# Patient Record
Sex: MALE | Race: WHITE | NOT HISPANIC OR LATINO | Employment: FULL TIME | URBAN - METROPOLITAN AREA
[De-identification: names, ages, dates, MRNs, and addresses within clinical notes are randomized per-mention and may not be internally consistent; named-entity substitution may affect disease eponyms.]

---

## 2018-08-07 ENCOUNTER — OFFICE VISIT (OUTPATIENT)
Dept: URGENT CARE | Facility: CLINIC | Age: 28
End: 2018-08-07
Payer: COMMERCIAL

## 2018-08-07 VITALS
OXYGEN SATURATION: 99 % | HEART RATE: 91 BPM | TEMPERATURE: 97.8 F | SYSTOLIC BLOOD PRESSURE: 138 MMHG | RESPIRATION RATE: 16 BRPM | HEIGHT: 74 IN | BODY MASS INDEX: 28.77 KG/M2 | DIASTOLIC BLOOD PRESSURE: 90 MMHG | WEIGHT: 224.2 LBS

## 2018-08-07 DIAGNOSIS — H60.332 ACUTE SWIMMER'S EAR OF LEFT SIDE: Primary | ICD-10-CM

## 2018-08-07 PROCEDURE — 99203 OFFICE O/P NEW LOW 30 MIN: CPT | Performed by: PHYSICIAN ASSISTANT

## 2018-08-07 NOTE — PATIENT INSTRUCTIONS
Use ear drops as directed  Lie with your left ear facing upwards for 5 minutes after instilling the drops  Tylenol for discomfort  Do not use any Q-tips, hydrogen peroxide, or other intra-ear products  Follow up with your family doctor in 3-5 days  Proceed to the ER if symptoms worsen

## 2018-08-07 NOTE — PROGRESS NOTES
[facility]        NAME: Navneet Eng  is a 33 y o  male  : 1990    MRN: [id number]  DATE: 2018  TIME: 12:07 PM    Assessment and Plan   Acute swimmer's ear of left side [H60 332]  1  Acute swimmer's ear of left side  neomycin-polymyxin-hydrocortisone (CORTISPORIN) otic solution     Patient Instructions   Use ear drops as directed  Lie with your left ear facing upwards for 5 minutes after instilling the drops  Tylenol for discomfort  Do not use any Q-tips, hydrogen peroxide, or other intra-ear products  Follow up with your family doctor in 3-5 days  Proceed to the ER if symptoms worsen  Reviewed elevated BP with pt  Advised having a recheck by PCP  Chief Complaint     Chief Complaint   Patient presents with    Earache     Pt states left ear discomfort x5 days pt states  some pain and clogged  Pt has been swimming recently  Pt used OTC drops for earaches  History of Present Illness       34 y/o male presenting with c/o sensation of left ear clogging and pain x 4 days  PT states symptoms feel similar to previous swimmer's ear infections that he's had, noting he does a lot of swimming  He did attempt to treat with OTC pain relieving drops with no relief  No F/C/HA, tinnitus, change in hearing, dizziness/lightheadedness, or balance abn  Review of Systems   Review of Systems   Constitutional: Negative for chills, fatigue and fever  HENT: Negative for ear discharge and hearing loss  Respiratory: Negative for shortness of breath and wheezing  Cardiovascular: Negative for chest pain and palpitations  Gastrointestinal: Negative for abdominal pain, diarrhea, nausea and vomiting  Musculoskeletal: Negative for arthralgias and myalgias  Skin: Negative for rash  Neurological: Negative for dizziness, light-headedness and headaches       Current Medications       Current Outpatient Prescriptions:     neomycin-polymyxin-hydrocortisone (CORTISPORIN) otic solution, Administer 4 drops into the left ear every 8 (eight) hours for 7 days   Lie with the ear upward for 5 minutes  , Disp: 10 mL, Rfl: 0    Current Allergies     Allergies as of 08/07/2018    (No Known Allergies)          The following portions of the patient's history were reviewed and updated as appropriate: allergies, current medications, past family history, past medical history, past social history, past surgical history and problem list      History reviewed  No pertinent past medical history  Past Surgical History:   Procedure Laterality Date    CLAVICLE SURGERY         Family History   Problem Relation Age of Onset    No Known Problems Mother     No Known Problems Father      Medications have been verified  Objective   /90 (BP Location: Right arm, Patient Position: Sitting, Cuff Size: Large)   Pulse 91   Temp 97 8 °F (36 6 °C) (Temporal)   Resp 16   Ht 6' 2" (1 88 m)   Wt 102 kg (224 lb 3 2 oz)   SpO2 99%   BMI 28 79 kg/m²      Physical Exam     Physical Exam   Constitutional: He is oriented to person, place, and time  He appears well-developed and well-nourished  No distress  HENT:   Head: Normocephalic and atraumatic  Right Ear: Hearing, tympanic membrane, external ear and ear canal normal    Left Ear: Hearing, tympanic membrane and external ear normal    Mild edema, skin flaking, and scant white discharge visible in the left ear canal  TM visible with no abn  Pinna NT to tugging  Eyes: Conjunctivae are normal    Cardiovascular: Normal rate, regular rhythm and normal heart sounds  Pulmonary/Chest: Effort normal and breath sounds normal  No respiratory distress  He has no decreased breath sounds  He has no wheezes  He has no rhonchi  He has no rales  Neurological: He is alert and oriented to person, place, and time  No cranial nerve deficit  He exhibits normal muscle tone  Coordination normal    Skin: Skin is warm and dry  No rash noted  He is not diaphoretic     Psychiatric: He has a normal mood and affect  His behavior is normal    Nursing note and vitals reviewed

## 2018-12-04 ENCOUNTER — OFFICE VISIT (OUTPATIENT)
Dept: FAMILY MEDICINE CLINIC | Facility: CLINIC | Age: 28
End: 2018-12-04
Payer: COMMERCIAL

## 2018-12-04 VITALS
RESPIRATION RATE: 16 BRPM | HEART RATE: 92 BPM | DIASTOLIC BLOOD PRESSURE: 70 MMHG | BODY MASS INDEX: 29.26 KG/M2 | TEMPERATURE: 98.2 F | HEIGHT: 74 IN | SYSTOLIC BLOOD PRESSURE: 158 MMHG | OXYGEN SATURATION: 97 % | WEIGHT: 228 LBS
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
## 2018-12-04 DIAGNOSIS — Z00.00 ENCOUNTER FOR ANNUAL GENERAL MEDICAL EXAMINATION WITHOUT ABNORMAL FINDINGS IN ADULT: Primary | ICD-10-CM

## 2018-12-04 DIAGNOSIS — Z23 NEED FOR DIPHTHERIA-TETANUS-PERTUSSIS (TDAP) VACCINE: ICD-10-CM

## 2018-12-04 DIAGNOSIS — M25.50 ARTHRALGIA, UNSPECIFIED JOINT: ICD-10-CM

## 2018-12-04 DIAGNOSIS — L20.82 FLEXURAL ECZEMA: ICD-10-CM

## 2018-12-04 DIAGNOSIS — Z13.1 SCREENING FOR DIABETES MELLITUS: ICD-10-CM

## 2018-12-04 DIAGNOSIS — F17.200 TOBACCO USE DISORDER: ICD-10-CM

## 2018-12-04 DIAGNOSIS — Z23 NEED FOR INFLUENZA VACCINATION: ICD-10-CM

## 2018-12-04 DIAGNOSIS — R03.0 ELEVATED BLOOD-PRESSURE READING WITHOUT DIAGNOSIS OF HYPERTENSION: ICD-10-CM

## 2018-12-04 DIAGNOSIS — Z13.220 SCREENING FOR LIPID DISORDERS: ICD-10-CM

## 2018-12-04 DIAGNOSIS — Z13.29 SCREENING FOR THYROID DISORDER: ICD-10-CM

## 2018-12-04 DIAGNOSIS — Z87.09 HISTORY OF ASTHMA: ICD-10-CM

## 2018-12-04 PROBLEM — J02.9 PHARYNGITIS: Status: ACTIVE | Noted: 2017-10-09

## 2018-12-04 PROBLEM — J02.9 PHARYNGITIS: Status: RESOLVED | Noted: 2017-10-09 | Resolved: 2018-12-04

## 2018-12-04 PROCEDURE — 90682 RIV4 VACC RECOMBINANT DNA IM: CPT

## 2018-12-04 PROCEDURE — 90471 IMMUNIZATION ADMIN: CPT

## 2018-12-04 PROCEDURE — 90472 IMMUNIZATION ADMIN EACH ADD: CPT

## 2018-12-04 PROCEDURE — 3008F BODY MASS INDEX DOCD: CPT | Performed by: NURSE PRACTITIONER

## 2018-12-04 PROCEDURE — 99213 OFFICE O/P EST LOW 20 MIN: CPT | Performed by: NURSE PRACTITIONER

## 2018-12-04 PROCEDURE — 36415 COLL VENOUS BLD VENIPUNCTURE: CPT | Performed by: NURSE PRACTITIONER

## 2018-12-04 PROCEDURE — 90715 TDAP VACCINE 7 YRS/> IM: CPT

## 2018-12-04 PROCEDURE — 99395 PREV VISIT EST AGE 18-39: CPT | Performed by: NURSE PRACTITIONER

## 2018-12-04 NOTE — ASSESSMENT & PLAN NOTE
I recommend nutrition and exercise  Monitor AM and PM blood pressures at home and record  Discussed cardiac and stroke risks of having high BP's, especially when consider his young age  Recommend pt return in one week to see home BP's and will evaluate need for possible medication management or further evaluation

## 2018-12-04 NOTE — PROGRESS NOTES
Assessment/Plan:    Problem List Items Addressed This Visit     Elevated blood-pressure reading without diagnosis of hypertension     I recommend nutrition and exercise  Monitor AM and PM blood pressures at home and record  Discussed cardiac and stroke risks of having high BP's, especially when consider his young age  Recommend pt return in one week to see home BP's and will evaluate need for possible medication management or further evaluation          History of asthma     Currently no issues, no medication management at this time  Discussed appropriate allergy control to avoid symptom exaccerbation         Screening for thyroid disorder    Relevant Orders    TSH, 3rd generation with Free T4 reflex    Tobacco use disorder     Briefly discussed implications for smoking cessation  Pt reports he has quit for 5 years in the past but picked up the habit once again  - encouraged pt that if he has quit in the past, he can do so again  - encouraged setting quit date when he is ready         Flexural eczema     Discussed allergy control and hydration of skin to avoid exacerbation of symptoms            Other Visit Diagnoses     Arthralgia, unspecified joint        Relevant Orders    CBC and differential    Lyme disease, western blot    Tick bite, initial encounter        Relevant Orders    Lyme disease, western blot           Patient Instructions     Monitor BP's AM and PM x's 1-2 weeks and record  - return to the office for review of results  - we may need to start on low dose medication at that time for cardiac protection    Fluids, exercise and nutrition  - whole grains, fruits and vegetables    Find a dentist for regular oral health check ups    Return in about 1 week (around 12/11/2018) for Recheck  Subjective:      Patient ID: Yohannes Shukla  is a 33 y o  male      Chief Complaint   Patient presents with    establish a new primary physician    Tdap and influenza vaccine       Jeet Edmondson is a 33year old male who presents to the office to establish care and be evaluated for joint pain and eczema  Reports he had been bit by deer tick multiple times over the summer and the latest bite was 3 weeks ago  Pt has had joint pain in upper and lower extremities for months intermittently  Denies fevers, chills, chest pain or SOB  Also c/o eczema rash, chronic in the areas of his elbows and both wrists with itching  Denies any other rashes  No other acute complaints  GF accompanies pt and states he has a history of high blood pressures for which she is concerned  The following portions of the patient's history were reviewed and updated as appropriate: allergies, current medications, past family history, past medical history, past social history, past surgical history and problem list     Review of Systems   Constitutional: Negative for chills, diaphoresis, fatigue and fever  Respiratory: Negative for chest tightness, shortness of breath and wheezing  Cardiovascular: Negative for chest pain  Musculoskeletal: Positive for arthralgias (upper and lower extremity joints intermittently)  Skin: Positive for rash (elbow and wrist flexors)  Neurological: Negative for dizziness and headaches  No current outpatient prescriptions on file  No current facility-administered medications for this visit  Objective:    /70   Pulse 92   Temp 98 2 °F (36 8 °C) (Temporal)   Resp 16   Ht 6' 1 75" (1 873 m)   Wt 103 kg (228 lb)   SpO2 97%   BMI 29 47 kg/m²        Physical Exam   Constitutional: He is oriented to person, place, and time  He appears well-developed and well-nourished  No distress  Eyes: Conjunctivae are normal  Right eye exhibits no discharge  Left eye exhibits no discharge  Neck: Normal range of motion  Neck supple  No tracheal deviation present  No thyromegaly present     Cardiovascular: Normal rate, regular rhythm, S1 normal, S2 normal, normal heart sounds, intact distal pulses and normal pulses  Exam reveals no gallop, no S3, no S4, no distant heart sounds and no friction rub  No murmur heard  Pulmonary/Chest: Effort normal and breath sounds normal  No respiratory distress  He has no wheezes  He has no rales  He exhibits no tenderness  Abdominal: Soft  Bowel sounds are normal  He exhibits no distension  There is no tenderness  Lymphadenopathy:     He has no cervical adenopathy  Neurological: He is alert and oriented to person, place, and time  Skin: Skin is warm and dry  Rash noted  Rash is papular  He is not diaphoretic  Erythematous papular rash noted in bilateral elbow flexors and left wrist flexor   Psychiatric: He has a normal mood and affect   His behavior is normal  Judgment and thought content normal          CATRACHITA Pride

## 2018-12-04 NOTE — ASSESSMENT & PLAN NOTE
Briefly discussed implications for smoking cessation  Pt reports he has quit for 5 years in the past but picked up the habit once again  - encouraged pt that if he has quit in the past, he can do so again  - encouraged setting quit date when he is ready

## 2018-12-04 NOTE — PATIENT INSTRUCTIONS
Monitor BP's AM and PM x's 1-2 weeks and record  - return to the office for review of results  - we may need to start on low dose medication at that time for cardiac protection    Fluids, exercise and nutrition  - whole grains, fruits and vegetables    Find a dentist for regular oral health check ups    Wellness Visit for Adults   AMBULATORY CARE:   A wellness visit  is when you see your healthcare provider to get screened for health problems  You can also get advice on how to stay healthy  Write down your questions so you remember to ask them  Ask your healthcare provider how often you should have a wellness visit  What happens at a wellness visit:  Your healthcare provider will ask about your health, and your family history of health problems  This includes high blood pressure, heart disease, and cancer  He or she will ask if you have symptoms that concern you, if you smoke, and about your mood  You may also be asked about your intake of medicines, supplements, food, and alcohol  Any of the following may be done:  · Your weight  will be checked  Your height may also be checked so your body mass index (BMI) can be calculated  Your BMI shows if you are at a healthy weight  · Your blood pressure  and heart rate will be checked  Your temperature may also be checked  · Blood and urine tests  may be done  Blood tests may be done to check your cholesterol levels  Abnormal cholesterol levels increase your risk for heart disease and stroke  You may also need a blood or urine test to check for diabetes if you are at increased risk  Urine tests may be done to look for signs of an infection or kidney disease  · A physical exam  includes checking your heartbeat and lungs with a stethoscope  Your healthcare provider may also check your skin to look for sun damage  · Screening tests  may be recommended  A screening test is done to check for diseases that may not cause symptoms   The screening tests you may need depend on your age, gender, family history, and lifestyle habits  For example, colorectal screening may be recommended if you are 48years old or older  Screening tests you need if you are a woman:   · A Pap smear  is used to screen for cervical cancer  Pap smears are usually done every 3 to 5 years depending on your age  You may need them more often if you have had abnormal Pap smear test results in the past  Ask your healthcare provider how often you should have a Pap smear  · A mammogram  is an x-ray of your breasts to screen for breast cancer  Experts recommend mammograms every 2 years starting at age 48 years  You may need a mammogram at age 52 years or younger if you have an increased risk for breast cancer  Talk to your healthcare provider about when you should start having mammograms and how often you need them  Vaccines you may need:   · Get an influenza vaccine  every year  The influenza vaccine protects you from the flu  Several types of viruses cause the flu  The viruses change over time, so new vaccines are made each year  · Get a tetanus-diphtheria (Td) booster vaccine  every 10 years  This vaccine protects you against tetanus and diphtheria  Tetanus is a severe infection that may cause painful muscle spasms and lockjaw  Diphtheria is a severe bacterial infection that causes a thick covering in the back of your mouth and throat  · Get a human papillomavirus (HPV) vaccine  if you are female and aged 23 to 32 or male 23 to 24 and never received it  This vaccine protects you from HPV infection  HPV is the most common infection spread by sexual contact  HPV may also cause vaginal, penile, and anal cancers  · Get a pneumococcal vaccine  if you are aged 72 years or older  The pneumococcal vaccine is an injection given to protect you from pneumococcal disease  Pneumococcal disease is an infection caused by pneumococcal bacteria   The infection may cause pneumonia, meningitis, or an ear infection  · Get a shingles vaccine  if you are aged 61 or older, even if you have had shingles before  The shingles vaccine is an injection to protect you from the varicella-zoster virus  This is the same virus that causes chickenpox  Shingles is a painful rash that develops in people who had chickenpox or have been exposed to the virus  How to eat healthy:  My Plate is a model for planning healthy meals  It shows the types and amounts of foods that should go on your plate  Fruits and vegetables make up about half of your plate, and grains and protein make up the other half  A serving of dairy is included on the side of your plate  The amount of calories and serving sizes you need depends on your age, gender, weight, and height  Examples of healthy foods are listed below:  · Eat a variety of vegetables  such as dark green, red, and orange vegetables  You can also include canned vegetables low in sodium (salt) and frozen vegetables without added butter or sauces  · Eat a variety of fresh fruits , canned fruit in 100% juice, frozen fruit, and dried fruit  · Include whole grains  At least half of the grains you eat should be whole grains  Examples include whole-wheat bread, wheat pasta, brown rice, and whole-grain cereals such as oatmeal     · Eat a variety of protein foods such as seafood (fish and shellfish), lean meat, and poultry without skin (turkey and chicken)  Examples of lean meats include pork leg, shoulder, or tenderloin, and beef round, sirloin, tenderloin, and extra lean ground beef  Other protein foods include eggs and egg substitutes, beans, peas, soy products, nuts, and seeds  · Choose low-fat dairy products such as skim or 1% milk or low-fat yogurt, cheese, and cottage cheese  · Limit unhealthy fats  such as butter, hard margarine, and shortening  Exercise:  Exercise at least 30 minutes per day on most days of the week   Some examples of exercise include walking, biking, dancing, and swimming  You can also fit in more physical activity by taking the stairs instead of the elevator or parking farther away from stores  Include muscle strengthening activities 2 days each week  Regular exercise provides many health benefits  It helps you manage your weight, and decreases your risk for type 2 diabetes, heart disease, stroke, and high blood pressure  Exercise can also help improve your mood  Ask your healthcare provider about the best exercise plan for you  General health and safety guidelines:   · Do not smoke  Nicotine and other chemicals in cigarettes and cigars can cause lung damage  Ask your healthcare provider for information if you currently smoke and need help to quit  E-cigarettes or smokeless tobacco still contain nicotine  Talk to your healthcare provider before you use these products  · Limit alcohol  A drink of alcohol is 12 ounces of beer, 5 ounces of wine, or 1½ ounces of liquor  · Lose weight, if needed  Being overweight increases your risk of certain health conditions  These include heart disease, high blood pressure, type 2 diabetes, and certain types of cancer  · Protect your skin  Do not sunbathe or use tanning beds  Use sunscreen with a SPF 15 or higher  Apply sunscreen at least 15 minutes before you go outside  Reapply sunscreen every 2 hours  Wear protective clothing, hats, and sunglasses when you are outside  · Drive safely  Always wear your seatbelt  Make sure everyone in your car wears a seatbelt  A seatbelt can save your life if you are in an accident  Do not use your cell phone when you are driving  This could distract you and cause an accident  Pull over if you need to make a call or send a text message  · Practice safe sex  Use latex condoms if are sexually active and have more than one partner  Your healthcare provider may recommend screening tests for sexually transmitted infections (STIs)      · Wear helmets, lifejackets, and protective gear  Always wear a helmet when you ride a bike or motorcycle, go skiing, or play sports that could cause a head injury  Wear protective equipment when you play sports  Wear a lifejacket when you are on a boat or doing water sports  © 2017 [employer] Information is for End User's use only and may not be sold, redistributed or otherwise used for commercial purposes  All illustrations and images included in CareNotes® are the copyrighted property of [employer]  or [employer]  The above information is an  only  It is not intended as medical advice for individual conditions or treatments  Talk to your doctor, nurse or pharmacist before following any medical regimen to see if it is safe and effective for you

## 2018-12-04 NOTE — PROGRESS NOTES
FAMILY PRACTICE HEALTH MAINTENANCE OFFICE VISIT  [facility]  [facility]    NAME: Monika Duque  AGE: 33 y o   SEX: male  : 1990     DATE: 2018    Assessment and Plan     Problem List Items Addressed This Visit     Elevated blood-pressure reading without diagnosis of hypertension     I recommend nutrition and exercise  Monitor AM and PM blood pressures at home and record  Discussed cardiac and stroke risks of having high BP's, especially when consider his young age  Recommend pt return in one week to see home BP's and will evaluate need for possible medication management or further evaluation          History of asthma     Currently no issues, no medication management at this time  Discussed appropriate allergy control to avoid symptom exaccerbation         Screening for thyroid disorder    Relevant Orders    TSH, 3rd generation with Free T4 reflex    Tobacco use disorder     Briefly discussed implications for smoking cessation  Pt reports he has quit for 5 years in the past but picked up the habit once again  - encouraged pt that if he has quit in the past, he can do so again  - encouraged setting quit date when he is ready         Flexural eczema     Discussed allergy control and hydration of skin to avoid exacerbation of symptoms            Other Visit Diagnoses     Encounter for annual general medical examination without abnormal findings in adult    -  Primary    Relevant Orders    CBC and differential    Comprehensive metabolic panel    TSH, 3rd generation with Free T4 reflex    Lipid panel    Arthralgia, unspecified joint        Relevant Orders    CBC and differential    Lyme disease, western blot    Screening for diabetes mellitus        Relevant Orders    Comprehensive metabolic panel    Screening for lipid disorders        Relevant Orders    Lipid panel    Tick bite, initial encounter        Relevant Orders    Lyme disease, western blot    Need for influenza vaccination        Relevant Orders    PREFERRED: influenza vaccine, [id number], quadrivalent, recombinant, PF, 0 5 mL (FLUBLOK) (Completed)    Need for diphtheria-tetanus-pertussis (Tdap) vaccine        Relevant Orders    TDAP VACCINE GREATER THAN OR EQUAL TO 6YO IM (Completed)            · Patient Counseling:   · Nutrition: Stressed importance of a well balanced diet, moderation of sodium/saturated fat, caloric balance and sufficient intake of fiber  · Exercise: Stressed the importance of regular exercise with a goal of 150 minutes per week  · Dental Health: Discussed daily flossing and brushing and regular dental visits   · Alcohol Use:  Recommended moderation of alcohol intake  · Injury Prevention: Discussed Safety Belts, Safety Helmets, and Smoke Detectors    · Immunizations reviewed DISCUSSED IMPORTANCE OF INFLUENZA AND TDAP  DISCUSSED INDICATIONS FOR VACCINATION  · Discussed benefits of screening AGE APPROPRIATE SCREENING  BMI Counseling: Body mass index is 29 47 kg/m²  Discussed with patient's BMI with him  The BMI is above average  BMI counseling and education was provided to the patient  Nutrition recommendations include decreasing overall calorie intake, 3-5 servings of fruits/vegetables daily, reducing fast food intake, consuming healthier snacks, decreasing soda and/or juice intake, moderation in carbohydrate intake and increasing intake of lean protein  Exercise recommendations include moderate aerobic physical activity for 150 minutes/week  Return in about 1 week (around 12/11/2018) for Recheck  Chief Complaint     Chief Complaint   Patient presents with    establish a new primary physician    Tdap and influenza vaccine       History of Present Illness     HPI    Well Adult Physical   Patient here for a comprehensive physical exam       Diet and Physical Activity  Diet: well balanced diet  Weight concerns: Patient is overweight (BMI 25 0-29  9)  Exercise: infrequently Depression Screen  PHQ-9 Depression Screening    PHQ-9:    Frequency of the following problems over the past two weeks:       Little interest or pleasure in doing things:  0 - not at all  Feeling down, depressed, or hopeless:  0 - not at all  PHQ-2 Score:  0          General Health  Hearing: Normal:  bilateral  Vision: no vision problems  Dental: DISCUSSED INDICATIONS FOR DENTAL FOLLOW UP AND EVALUATION OF ORAL HEALTH  RECOMMEND FINDING DENTIST AND SCHEDULING APPT    Reproductive Health  SELF BREAST AND TESTICULAR EXAMS    The following portions of the patient's history were reviewed and updated as appropriate: allergies, current medications, past family history, past medical history, past social history, past surgical history and problem list     Review of Systems     Review of Systems   Constitutional: Positive for fatigue  Negative for diaphoresis and fever  HENT: Negative for ear pain and hearing loss  Eyes: Negative for pain and visual disturbance  Respiratory: Negative for chest tightness and shortness of breath  Cardiovascular: Negative for chest pain, palpitations and leg swelling  Gastrointestinal: Negative for abdominal pain, constipation and diarrhea  Genitourinary: Negative for difficulty urinating  Musculoskeletal: Positive for arthralgias  Negative for myalgias  Skin: Positive for rash  Neurological: Negative for dizziness, numbness and headaches  Psychiatric/Behavioral: Negative for sleep disturbance         Past Medical History     Past Medical History:   Diagnosis Date    Flexural eczema 12/4/2018       Past Surgical History     Past Surgical History:   Procedure Laterality Date    CLAVICLE SURGERY         Social History     Social History     Social History    Marital status: Unknown     Spouse name: N/A    Number of children: N/A    Years of education: N/A     Social History Main Topics    Smoking status: Current Every Day Smoker     Packs/day: 1 00     Years: 3 00    Smokeless tobacco: Never Used    Alcohol use Yes      Comment: social    Drug use: No    Sexual activity: Not Asked     Other Topics Concern    None     Social History Narrative    None       Family History     Family History   Problem Relation Age of Onset    No Known Problems Mother     No Known Problems Father     Mental illness Paternal Grandmother     Substance Abuse Neg Hx        Current Medications     No current outpatient prescriptions on file  Allergies     No Known Allergies    Objective     /70   Pulse 92   Temp 98 2 °F (36 8 °C) (Temporal)   Resp 16   Ht 6' 1 75" (1 873 m)   Wt 103 kg (228 lb)   SpO2 97%   BMI 29 47 kg/m²      Physical Exam   Constitutional: He is oriented to person, place, and time  He appears well-developed and well-nourished  HENT:   Head: Normocephalic and atraumatic  Right Ear: Tympanic membrane and external ear normal    Left Ear: Tympanic membrane and external ear normal    Eyes: Pupils are equal, round, and reactive to light  Conjunctivae, EOM and lids are normal  Right eye exhibits no discharge  Left eye exhibits no discharge  Right conjunctiva is not injected  Left conjunctiva is not injected  Right eye exhibits normal extraocular motion and no nystagmus  Left eye exhibits normal extraocular motion and no nystagmus  Right pupil is round and reactive  Left pupil is round and reactive  Pupils are equal    Neck: Normal range of motion  Neck supple  Carotid bruit is not present  No tracheal deviation present  No thyromegaly present  Cardiovascular: Normal rate, regular rhythm, normal heart sounds and intact distal pulses  Exam reveals no gallop and no friction rub  No murmur heard  Pulmonary/Chest: Effort normal and breath sounds normal  No respiratory distress  He has no wheezes  He has no rales  Abdominal: Soft  Bowel sounds are normal  He exhibits no distension  There is no splenomegaly or hepatomegaly  There is no tenderness   There is no rebound  Genitourinary: Rectum normal, prostate normal, testes normal and penis normal    Musculoskeletal: Normal range of motion  He exhibits no edema, tenderness or deformity  Lymphadenopathy:     He has no cervical adenopathy  Neurological: He is alert and oriented to person, place, and time  He has normal strength and normal reflexes  No cranial nerve deficit  Coordination normal    Skin: Skin is warm and dry  Rash noted  Rash is papular  No erythema  Erythematous papular rash noted elbow flexors and left wrist   Psychiatric: He has a normal mood and affect   His behavior is normal  Judgment and thought content normal          Health Maintenance     Health Maintenance   Topic Date Due    Pneumococcal PPSV23 Medium Risk Adult (1 of 1 - PPSV23) 02/25/2010    Depression Screening PHQ  12/04/2019    DTaP,Tdap,and Td Vaccines (2 - Td) 12/04/2028    INFLUENZA VACCINE  Completed     Immunization History   Administered Date(s) Administered    Influenza, recombinant, quadrivalent,injectable, preservative free 12/04/2018    Tdap 12/04/2018       Maria Dolores Smith, [address] [facility],[address]

## 2018-12-04 NOTE — ASSESSMENT & PLAN NOTE
Currently no issues, no medication management at this time  Discussed appropriate allergy control to avoid symptom exaccerbation

## 2018-12-05 LAB
ALBUMIN SERPL-MCNC: 5 G/DL (ref 3.5–5.5)
ALBUMIN/GLOB SERPL: 1.6 {RATIO} (ref 1.2–2.2)
ALP SERPL-CCNC: 68 IU/L (ref 39–117)
ALT SERPL-CCNC: 61 IU/L (ref 0–44)
AST SERPL-CCNC: 53 IU/L (ref 0–40)
B BURGDOR IGG PATRN SER IB-IMP: NEGATIVE
B BURGDOR IGM PATRN SER IB-IMP: NEGATIVE
B BURGDOR18KD IGG SER QL IB: ABNORMAL
B BURGDOR23KD IGG SER QL IB: ABNORMAL
B BURGDOR23KD IGM SER QL IB: ABNORMAL
B BURGDOR28KD IGG SER QL IB: ABNORMAL
B BURGDOR30KD IGG SER QL IB: ABNORMAL
B BURGDOR39KD IGG SER QL IB: ABNORMAL
B BURGDOR39KD IGM SER QL IB: ABNORMAL
B BURGDOR41KD IGG SER QL IB: ABNORMAL
B BURGDOR41KD IGM SER QL IB: PRESENT
B BURGDOR45KD IGG SER QL IB: ABNORMAL
B BURGDOR58KD IGG SER QL IB: ABNORMAL
B BURGDOR66KD IGG SER QL IB: ABNORMAL
B BURGDOR93KD IGG SER QL IB: ABNORMAL
BASOPHILS # BLD AUTO: 0 X10E3/UL (ref 0–0.2)
BASOPHILS NFR BLD AUTO: 1 %
BILIRUB SERPL-MCNC: 0.5 MG/DL (ref 0–1.2)
BUN SERPL-MCNC: 12 MG/DL (ref 6–20)
BUN/CREAT SERPL: 12 (ref 9–20)
CALCIUM SERPL-MCNC: 10 MG/DL (ref 8.7–10.2)
CHLORIDE SERPL-SCNC: 100 MMOL/L (ref 96–106)
CHOLEST SERPL-MCNC: 265 MG/DL (ref 100–199)
CHOLEST/HDLC SERPL: 4.4 RATIO (ref 0–5)
CO2 SERPL-SCNC: 23 MMOL/L (ref 20–29)
CREAT SERPL-MCNC: 1 MG/DL (ref 0.76–1.27)
EOSINOPHIL # BLD AUTO: 0.4 X10E3/UL (ref 0–0.4)
EOSINOPHIL NFR BLD AUTO: 5 %
ERYTHROCYTE [DISTWIDTH] IN BLOOD BY AUTOMATED COUNT: 13.2 % (ref 12.3–15.4)
GLOBULIN SER-MCNC: 3.1 G/DL (ref 1.5–4.5)
GLUCOSE SERPL-MCNC: 95 MG/DL (ref 65–99)
HCT VFR BLD AUTO: 47.4 % (ref 37.5–51)
HDLC SERPL-MCNC: 60 MG/DL
HGB BLD-MCNC: 16.2 G/DL (ref 13–17.7)
IMM GRANULOCYTES # BLD: 0 X10E3/UL (ref 0–0.1)
IMM GRANULOCYTES NFR BLD: 0 %
LDLC SERPL CALC-MCNC: 173 MG/DL (ref 0–99)
LYMPHOCYTES # BLD AUTO: 2.1 X10E3/UL (ref 0.7–3.1)
LYMPHOCYTES NFR BLD AUTO: 28 %
MCH RBC QN AUTO: 31.8 PG (ref 26.6–33)
MCHC RBC AUTO-ENTMCNC: 34.2 G/DL (ref 31.5–35.7)
MCV RBC AUTO: 93 FL (ref 79–97)
MONOCYTES # BLD AUTO: 0.6 X10E3/UL (ref 0.1–0.9)
MONOCYTES NFR BLD AUTO: 8 %
NEUTROPHILS # BLD AUTO: 4.5 X10E3/UL (ref 1.4–7)
NEUTROPHILS NFR BLD AUTO: 58 %
PLATELET # BLD AUTO: 305 X10E3/UL (ref 150–379)
POTASSIUM SERPL-SCNC: 4.1 MMOL/L (ref 3.5–5.2)
PROT SERPL-MCNC: 8.1 G/DL (ref 6–8.5)
RBC # BLD AUTO: 5.1 X10E6/UL (ref 4.14–5.8)
SL AMB EGFR AFRICAN AMERICAN: 119 ML/MIN/1.73
SL AMB EGFR NON AFRICAN AMERICAN: 103 ML/MIN/1.73
SL AMB VLDL CHOLESTEROL CALC: 32 MG/DL (ref 5–40)
SODIUM SERPL-SCNC: 138 MMOL/L (ref 134–144)
TRIGL SERPL-MCNC: 160 MG/DL (ref 0–149)
TSH SERPL DL<=0.005 MIU/L-ACNC: 1.37 UIU/ML (ref 0.45–4.5)
WBC # BLD AUTO: 7.7 X10E3/UL (ref 3.4–10.8)

## 2018-12-13 ENCOUNTER — OFFICE VISIT (OUTPATIENT)
Dept: FAMILY MEDICINE CLINIC | Facility: CLINIC | Age: 28
End: 2018-12-13
Payer: COMMERCIAL

## 2018-12-13 VITALS
SYSTOLIC BLOOD PRESSURE: 158 MMHG | WEIGHT: 240 LBS | BODY MASS INDEX: 30.8 KG/M2 | OXYGEN SATURATION: 96 % | DIASTOLIC BLOOD PRESSURE: 80 MMHG | RESPIRATION RATE: 16 BRPM | TEMPERATURE: 98.2 F | HEIGHT: 74 IN | HEART RATE: 84 BPM

## 2018-12-13 DIAGNOSIS — J45.20 MILD INTERMITTENT ASTHMA WITHOUT COMPLICATION: ICD-10-CM

## 2018-12-13 DIAGNOSIS — F17.200 TOBACCO USE DISORDER: ICD-10-CM

## 2018-12-13 DIAGNOSIS — E78.2 MIXED HYPERLIPIDEMIA: ICD-10-CM

## 2018-12-13 DIAGNOSIS — I10 ESSENTIAL HYPERTENSION: Primary | ICD-10-CM

## 2018-12-13 PROCEDURE — 99213 OFFICE O/P EST LOW 20 MIN: CPT | Performed by: NURSE PRACTITIONER

## 2018-12-13 PROCEDURE — 4004F PT TOBACCO SCREEN RCVD TLK: CPT | Performed by: NURSE PRACTITIONER

## 2018-12-13 PROCEDURE — 3008F BODY MASS INDEX DOCD: CPT | Performed by: NURSE PRACTITIONER

## 2018-12-13 RX ORDER — ALBUTEROL SULFATE 90 UG/1
2 AEROSOL, METERED RESPIRATORY (INHALATION) EVERY 6 HOURS PRN
Qty: 8.5 G | Refills: 2 | Status: SHIPPED | OUTPATIENT
Start: 2018-12-13 | End: 2019-09-10 | Stop reason: SDUPTHER

## 2018-12-13 RX ORDER — CANDESARTAN 16 MG/1
16 TABLET ORAL DAILY
Qty: 30 TABLET | Refills: 2 | Status: SHIPPED | OUTPATIENT
Start: 2018-12-13 | End: 2019-10-31 | Stop reason: SDUPTHER

## 2018-12-13 NOTE — ASSESSMENT & PLAN NOTE
Will start on low dose of candesartan to control BP  3 month goal to reduce blood pressure and cholesterol  - recommend mediterranean style eating, increased daily exercise  Discussed cardiac risks, pt verbalized understanding

## 2018-12-13 NOTE — PROGRESS NOTES
Assessment/Plan:    Problem List Items Addressed This Visit     Tobacco use disorder     Recommend smoking cessation         Mixed hyperlipidemia     Discussed 3 month goals  Recommend mediterranean style eating  - discussion and handout provided         Essential hypertension - Primary     Will start on low dose of candesartan to control BP  3 month goal to reduce blood pressure and cholesterol  - recommend mediterranean style eating, increased daily exercise  Discussed cardiac risks, pt verbalized understanding         Relevant Medications    candesartan (ATACAND) 16 mg tablet      Other Visit Diagnoses     Mild intermittent asthma without complication        Relevant Medications    albuterol (PROAIR HFA) 90 mcg/act inhaler        Return in about 3 months (around 3/13/2019) for Recheck BP and CMP, lipid panel  Subjective:      Patient ID: Per Mcfarland  is a 33 y o  male  Chief Complaint   Patient presents with    Follow-up     blood pressure and blood work       Karolina Ventura returns to the office for recheck of blood pressure and to discuss lab work  No acute complaints today  The following portions of the patient's history were reviewed and updated as appropriate: allergies, current medications, past family history, past medical history, past social history, past surgical history and problem list     Review of Systems   Constitutional: Negative for chills and fever  Respiratory: Negative for chest tightness and shortness of breath  Cardiovascular: Negative for chest pain  Current Outpatient Prescriptions   Medication Sig Dispense Refill    albuterol (PROAIR HFA) 90 mcg/act inhaler Inhale 2 puffs every 6 (six) hours as needed for wheezing 8 5 g 2    candesartan (ATACAND) 16 mg tablet Take 1 tablet (16 mg total) by mouth daily 30 tablet 2     No current facility-administered medications for this visit          Objective:    /80   Pulse 84   Temp 98 2 °F (36 8 °C) (Temporal)   Resp 16    6' 1 75" (1 873 m)   Wt 109 kg (240 lb)   SpO2 96%   BMI 31 02 kg/m²        Physical Exam   Constitutional: He is oriented to person, place, and time  He appears well-developed and well-nourished  No distress  HENT:   Head: Normocephalic and atraumatic  Eyes: Conjunctivae are normal  Right eye exhibits no discharge  Left eye exhibits no discharge  Neck: Normal range of motion  Neck supple  Neurological: He is alert and oriented to person, place, and time  Skin: He is not diaphoretic  Psychiatric: He has a normal mood and affect   His behavior is normal  Judgment and thought content normal        CATRACHITA Gupta

## 2019-09-10 DIAGNOSIS — J45.20 MILD INTERMITTENT ASTHMA WITHOUT COMPLICATION: ICD-10-CM

## 2019-09-10 RX ORDER — ALBUTEROL SULFATE 90 UG/1
2 AEROSOL, METERED RESPIRATORY (INHALATION) EVERY 6 HOURS PRN
Qty: 8.5 G | Refills: 2 | Status: SHIPPED | OUTPATIENT
Start: 2019-09-10 | End: 2020-01-03

## 2019-09-11 ENCOUNTER — OFFICE VISIT (OUTPATIENT)
Dept: URGENT CARE | Facility: CLINIC | Age: 29
End: 2019-09-11
Payer: COMMERCIAL

## 2019-09-11 VITALS
RESPIRATION RATE: 16 BRPM | BODY MASS INDEX: 30.64 KG/M2 | TEMPERATURE: 98.4 F | HEART RATE: 83 BPM | WEIGHT: 237 LBS | DIASTOLIC BLOOD PRESSURE: 108 MMHG | OXYGEN SATURATION: 100 % | SYSTOLIC BLOOD PRESSURE: 132 MMHG

## 2019-09-11 DIAGNOSIS — J45.31 MILD PERSISTENT ASTHMA WITH ACUTE EXACERBATION: Primary | ICD-10-CM

## 2019-09-11 PROCEDURE — 99213 OFFICE O/P EST LOW 20 MIN: CPT | Performed by: FAMILY MEDICINE

## 2019-09-11 RX ORDER — FLUTICASONE PROPIONATE 110 UG/1
2 AEROSOL, METERED RESPIRATORY (INHALATION) 2 TIMES DAILY
Qty: 12 G | Refills: 0 | Status: SHIPPED | OUTPATIENT
Start: 2019-09-11 | End: 2020-03-25 | Stop reason: SDUPTHER

## 2019-09-11 RX ORDER — PREDNISONE 50 MG/1
50 TABLET ORAL DAILY
Qty: 5 TABLET | Refills: 0 | Status: SHIPPED | OUTPATIENT
Start: 2019-09-11 | End: 2019-09-16

## 2019-09-11 NOTE — PROGRESS NOTES
[facility]        NAME: Brodie Lundy  is a 30 y o  male  : 1990    MRN: [id number]  DATE: 2019  TIME: 4:57 PM    Assessment and Plan   Mild persistent asthma with acute exacerbation [J45 31]  1  Mild persistent asthma with acute exacerbation  predniSONE 50 mg tablet    fluticasone (FLOVENT HFA) 110 MCG/ACT inhaler     Asthma exacerbation per clinical evaluation  Peak flow of 350 cc/minute  Refused DuoNeb treatment in the office due to outside time constraints  Prescribed a 5 day burst of prednisone and started on maintenance therapy - Flovent  Instructed to follow up with PCP for continued care and management  Patient Instructions     Follow up with PCP in 3-5 days  Proceed to  ER if symptoms worsen  Chief Complaint     Chief Complaint   Patient presents with    Shortness of Breath     pt states when lying down he feels SOB; when upright no discomfort, states the congestion loosens up; started 3 months ago when he ceased cigarrette smoking and started Juul 6-7 months ago, stopped 5-6 weeks ago; now trouble with taking breaths         History of Present Illness       78-year-old male with history of asthma presents today with increased shortness of breath and chest tightness which has persist for the duration of summer  Reports that symptoms worsen at nighttime when trying to sleep  Has experienced improvement when using his albuterol inhaler, but notices that he is starting to developed tolerance to the medication  Reports having seasonal allergies during the spring and fall  Notices that when he takes an antihistamine, his asthma flare ups are less in frequency  Denies any obvious fevers, chills or chest pain  No obvious sick contacts  Review of Systems   Review of Systems   Constitutional: Negative for chills and fever  Respiratory: Positive for cough, chest tightness, shortness of breath and wheezing  Cardiovascular: Negative for chest pain  Gastrointestinal: Negative for nausea  Allergic/Immunologic: Positive for environmental allergies  Neurological: Negative for dizziness and headaches  Current Medications       Current Outpatient Medications:     albuterol (PROAIR HFA) 90 mcg/act inhaler, Inhale 2 puffs every 6 (six) hours as needed for wheezing, Disp: 8 5 g, Rfl: 2    candesartan (ATACAND) 16 mg tablet, Take 1 tablet (16 mg total) by mouth daily (Patient not taking: Reported on 9/11/2019), Disp: 30 tablet, Rfl: 2    fluticasone (FLOVENT HFA) 110 MCG/ACT inhaler, Inhale 2 puffs 2 (two) times a day Rinse mouth after use , Disp: 12 g, Rfl: 0    predniSONE 50 mg tablet, Take 1 tablet (50 mg total) by mouth daily for 5 days, Disp: 5 tablet, Rfl: 0    Current Allergies     Allergies as of 09/11/2019    (No Known Allergies)            The following portions of the patient's history were reviewed and updated as appropriate: allergies, current medications, past family history, past medical history, past social history, past surgical history and problem list      Past Medical History:   Diagnosis Date    Asthma     Essential hypertension 12/13/2018    Flexural eczema 12/4/2018       Past Surgical History:   Procedure Laterality Date    CLAVICLE SURGERY         Family History   Problem Relation Age of Onset    No Known Problems Mother     No Known Problems Father     Mental illness Paternal Grandmother     Substance Abuse Neg Hx          Medications have been verified  Objective   BP (!) 132/108   Pulse 83   Temp 98 4 °F (36 9 °C)   Resp 16   Wt 108 kg (237 lb)   SpO2 100%   BMI 30 64 kg/m²        Physical Exam     Physical Exam   Constitutional: He is oriented to person, place, and time  He appears well-developed and well-nourished  Non-toxic appearance  He does not appear ill  No distress  HENT:   Head: Normocephalic and atraumatic  Cardiovascular: Normal rate, regular rhythm and normal heart sounds  Pulmonary/Chest: He is in respiratory distress  He has wheezes (end-expiratory) in the right upper field, the right middle field, the left upper field, the left middle field and the left lower field  He has no rhonchi  Neurological: He is alert and oriented to person, place, and time  He is not disoriented  No cranial nerve deficit  Skin: Skin is warm  No erythema  Psychiatric: He has a normal mood and affect  His behavior is normal  His mood appears not anxious  He is not agitated  Nursing note and vitals reviewed

## 2019-10-31 ENCOUNTER — OFFICE VISIT (OUTPATIENT)
Dept: FAMILY MEDICINE CLINIC | Facility: CLINIC | Age: 29
End: 2019-10-31
Payer: COMMERCIAL

## 2019-10-31 VITALS
BODY MASS INDEX: 31.54 KG/M2 | TEMPERATURE: 98 F | DIASTOLIC BLOOD PRESSURE: 78 MMHG | HEART RATE: 118 BPM | RESPIRATION RATE: 18 BRPM | WEIGHT: 238 LBS | OXYGEN SATURATION: 98 % | HEIGHT: 73 IN | SYSTOLIC BLOOD PRESSURE: 130 MMHG

## 2019-10-31 DIAGNOSIS — J45.41 MODERATE PERSISTENT ASTHMA WITH ACUTE EXACERBATION: Primary | ICD-10-CM

## 2019-10-31 DIAGNOSIS — I10 ESSENTIAL HYPERTENSION: ICD-10-CM

## 2019-10-31 PROCEDURE — 3078F DIAST BP <80 MM HG: CPT | Performed by: FAMILY MEDICINE

## 2019-10-31 PROCEDURE — 3075F SYST BP GE 130 - 139MM HG: CPT | Performed by: FAMILY MEDICINE

## 2019-10-31 PROCEDURE — 94640 AIRWAY INHALATION TREATMENT: CPT | Performed by: FAMILY MEDICINE

## 2019-10-31 PROCEDURE — 99213 OFFICE O/P EST LOW 20 MIN: CPT | Performed by: FAMILY MEDICINE

## 2019-10-31 PROCEDURE — 3008F BODY MASS INDEX DOCD: CPT | Performed by: FAMILY MEDICINE

## 2019-10-31 RX ORDER — CANDESARTAN 16 MG/1
16 TABLET ORAL DAILY
Qty: 30 TABLET | Refills: 2 | Status: SHIPPED | OUTPATIENT
Start: 2019-10-31 | End: 2020-03-06

## 2019-10-31 RX ORDER — PREDNISONE 20 MG/1
TABLET ORAL
Qty: 14 TABLET | Refills: 0 | Status: SHIPPED | OUTPATIENT
Start: 2019-10-31 | End: 2020-10-13 | Stop reason: ALTCHOICE

## 2019-11-01 RX ORDER — ALBUTEROL SULFATE 2.5 MG/3ML
2.5 SOLUTION RESPIRATORY (INHALATION) ONCE
Status: COMPLETED | OUTPATIENT
Start: 2019-11-01 | End: 2019-11-01

## 2019-11-01 RX ADMIN — ALBUTEROL SULFATE 2.5 MG: 2.5 SOLUTION RESPIRATORY (INHALATION) at 15:14

## 2019-11-01 NOTE — PROGRESS NOTES
Assessment/Plan:    1  Moderate persistent asthma with acute exacerbation  -     predniSONE 20 mg tablet; 2 PO QD X 4 DAYS, THEN 1 PO QD X 4 DAYS, THEN 1/2 PO QD X 4 DAYS  -     fluticasone-salmeterol (ADVAIR, WIXELA) 250-50 mcg/dose inhaler; Inhale 1 puff 2 (two) times a day Rinse mouth after use  -     albuterol inhalation solution 2 5 mg    2  Essential hypertension  -     candesartan (ATACAND) 16 mg tablet; Take 1 tablet (16 mg total) by mouth daily          BMI Counseling: Body mass index is 31 4 kg/m²  Discussed the patient's BMI with him  The BMI is above normal  Nutrition recommendations include decreasing overall calorie intake  Patient Instructions   PLENTY FLUIDS  REST  MUCINEX  MEDICATION AS DIRECTED  IF SYMPTOMS PERSIST OR WORSEN, PLEASE CALL    CALL IN 2 WEEKS WITH UPDATE        Return in about 6 weeks (around 12/12/2019) for Recheck  Subjective:      Patient ID: Diogo Stovall  is a 30 y o  male  Chief Complaint   Patient presents with    Wheezing       PATIENT STATES HE HAS BEEN WHEEZING MORE LATELY  SL COUGH  WENT TO [facility] - GIVEN SOME PREDNISONE - FELT BETTER, NOW WORSE    DENIES ANY ILLNESS, FEVER  NO SPUTUM  NO NVD    LONG HISTORY OF ASTHMA      The following portions of the patient's history were reviewed and updated as appropriate: allergies, current medications, past family history, past medical history, past social history, past surgical history and problem list     Review of Systems   Constitutional: Negative for chills and fever  HENT: Positive for congestion and postnasal drip  Negative for ear discharge, rhinorrhea, sinus pressure, sinus pain and sore throat  Eyes: Negative for discharge and redness  Respiratory: Positive for cough and wheezing  Cardiovascular: Negative for chest pain  Gastrointestinal: Negative for abdominal pain, constipation, diarrhea, nausea and vomiting  Musculoskeletal: Negative for arthralgias and myalgias  Skin: Negative for rash  Hematological: Negative for adenopathy  Psychiatric/Behavioral: The patient is not nervous/anxious  Current Outpatient Medications   Medication Sig Dispense Refill    albuterol (PROAIR HFA) 90 mcg/act inhaler Inhale 2 puffs every 6 (six) hours as needed for wheezing 8 5 g 2    candesartan (ATACAND) 16 mg tablet Take 1 tablet (16 mg total) by mouth daily 30 tablet 2    FEXOFENADINE HCL PO Take by mouth as needed (Allegra)      fluticasone (FLOVENT HFA) 110 MCG/ACT inhaler Inhale 2 puffs 2 (two) times a day Rinse mouth after use  (Patient not taking: Reported on 10/31/2019) 12 g 0    fluticasone-salmeterol (ADVAIR, WIXELA) 250-50 mcg/dose inhaler Inhale 1 puff 2 (two) times a day Rinse mouth after use  1 Inhaler 3    predniSONE 20 mg tablet 2 PO QD X 4 DAYS, THEN 1 PO QD X 4 DAYS, THEN 1/2 PO QD X 4 DAYS 14 tablet 0     Current Facility-Administered Medications   Medication Dose Route Frequency Provider Last Rate Last Dose    albuterol inhalation solution 2 5 mg  2 5 mg Nebulization Once Theo Duarte MD           Objective:    /78 (BP Location: Left arm, Patient Position: Sitting, Cuff Size: Large)   Pulse (!) 118   Temp 98 °F (36 7 °C) (Temporal)   Resp 18   Ht 6' 1" (1 854 m)   Wt 108 kg (238 lb)   SpO2 98%   BMI 31 40 kg/m²        Physical Exam   Constitutional: He is oriented to person, place, and time  He appears well-developed and well-nourished  HENT:   Head: Normocephalic and atraumatic  Eyes: Pupils are equal, round, and reactive to light  Conjunctivae and EOM are normal  Right eye exhibits no discharge  Left eye exhibits no discharge  Neck: Neck supple  No JVD present  No thyromegaly present  Cardiovascular: Normal rate, regular rhythm and normal heart sounds  No murmur heard  Pulmonary/Chest: Effort normal  He has wheezes  He has no rales  Abdominal: Soft  Bowel sounds are normal  He exhibits no mass  There is no hepatosplenomegaly  There is no tenderness  There is no rebound, no guarding and no CVA tenderness  Musculoskeletal: Normal range of motion  He exhibits no edema, tenderness or deformity  Lymphadenopathy:     He has no cervical adenopathy  He has no axillary adenopathy  Neurological: He is alert and oriented to person, place, and time  Skin: Skin is warm and dry  No rash noted  No erythema  Psychiatric: He has a normal mood and affect   His behavior is normal  Judgment and thought content normal           AIR MOVEMENT MUCH IMPROVED AFTER NEB TREATMENT    Sofia Marcum MD

## 2019-11-01 NOTE — PATIENT INSTRUCTIONS
PLENTY FLUIDS  REST  MUCINEX  MEDICATION AS DIRECTED  IF SYMPTOMS PERSIST OR WORSEN, PLEASE CALL    CALL IN 2 WEEKS WITH UPDATE

## 2020-01-03 DIAGNOSIS — J45.20 MILD INTERMITTENT ASTHMA WITHOUT COMPLICATION: ICD-10-CM

## 2020-03-06 DIAGNOSIS — I10 ESSENTIAL HYPERTENSION: ICD-10-CM

## 2020-03-06 RX ORDER — CANDESARTAN 16 MG/1
TABLET ORAL
Qty: 30 TABLET | Refills: 0 | Status: SHIPPED | OUTPATIENT
Start: 2020-03-06 | End: 2020-04-23

## 2020-03-25 DIAGNOSIS — J45.41 MODERATE PERSISTENT ASTHMA WITH ACUTE EXACERBATION: ICD-10-CM

## 2020-03-25 DIAGNOSIS — J45.31 MILD PERSISTENT ASTHMA WITH ACUTE EXACERBATION: ICD-10-CM

## 2020-03-25 DIAGNOSIS — J45.20 MILD INTERMITTENT ASTHMA WITHOUT COMPLICATION: ICD-10-CM

## 2020-03-26 RX ORDER — FLUTICASONE PROPIONATE 110 UG/1
2 AEROSOL, METERED RESPIRATORY (INHALATION) 2 TIMES DAILY
Qty: 12 G | Refills: 3 | Status: SHIPPED | OUTPATIENT
Start: 2020-03-26 | End: 2020-10-13

## 2020-04-23 DIAGNOSIS — I10 ESSENTIAL HYPERTENSION: ICD-10-CM

## 2020-04-23 RX ORDER — CANDESARTAN 16 MG/1
TABLET ORAL
Qty: 30 TABLET | Refills: 0 | Status: SHIPPED | OUTPATIENT
Start: 2020-04-23 | End: 2020-06-07

## 2020-06-07 DIAGNOSIS — I10 ESSENTIAL HYPERTENSION: ICD-10-CM

## 2020-06-07 RX ORDER — CANDESARTAN 16 MG/1
TABLET ORAL
Qty: 30 TABLET | Refills: 0 | Status: SHIPPED | OUTPATIENT
Start: 2020-06-07 | End: 2020-10-07

## 2020-10-07 DIAGNOSIS — I10 ESSENTIAL HYPERTENSION: ICD-10-CM

## 2020-10-07 RX ORDER — CANDESARTAN 16 MG/1
TABLET ORAL
Qty: 30 TABLET | Refills: 0 | Status: SHIPPED | OUTPATIENT
Start: 2020-10-07 | End: 2020-10-13 | Stop reason: SDUPTHER

## 2020-10-13 ENCOUNTER — TELEMEDICINE (OUTPATIENT)
Dept: FAMILY MEDICINE CLINIC | Facility: CLINIC | Age: 30
End: 2020-10-13
Payer: COMMERCIAL

## 2020-10-13 VITALS
BODY MASS INDEX: 30.48 KG/M2 | DIASTOLIC BLOOD PRESSURE: 98 MMHG | SYSTOLIC BLOOD PRESSURE: 148 MMHG | WEIGHT: 230 LBS | HEIGHT: 73 IN

## 2020-10-13 DIAGNOSIS — J45.41 MODERATE PERSISTENT ASTHMA WITH ACUTE EXACERBATION: ICD-10-CM

## 2020-10-13 DIAGNOSIS — I10 ESSENTIAL HYPERTENSION: Primary | ICD-10-CM

## 2020-10-13 DIAGNOSIS — J45.20 MILD INTERMITTENT ASTHMA WITHOUT COMPLICATION: ICD-10-CM

## 2020-10-13 PROBLEM — R03.0 ELEVATED BLOOD-PRESSURE READING WITHOUT DIAGNOSIS OF HYPERTENSION: Status: RESOLVED | Noted: 2017-10-09 | Resolved: 2020-10-13

## 2020-10-13 PROCEDURE — 99213 OFFICE O/P EST LOW 20 MIN: CPT | Performed by: NURSE PRACTITIONER

## 2020-10-13 PROCEDURE — 3725F SCREEN DEPRESSION PERFORMED: CPT | Performed by: NURSE PRACTITIONER

## 2020-10-13 PROCEDURE — 3080F DIAST BP >= 90 MM HG: CPT | Performed by: NURSE PRACTITIONER

## 2020-10-13 PROCEDURE — 1036F TOBACCO NON-USER: CPT | Performed by: NURSE PRACTITIONER

## 2020-10-13 RX ORDER — CANDESARTAN 16 MG/1
16 TABLET ORAL DAILY
Qty: 90 TABLET | Refills: 2 | Status: SHIPPED | OUTPATIENT
Start: 2020-10-13 | End: 2021-07-29 | Stop reason: SDUPTHER

## 2020-10-14 ENCOUNTER — TELEPHONE (OUTPATIENT)
Dept: FAMILY MEDICINE CLINIC | Facility: CLINIC | Age: 30
End: 2020-10-14

## 2020-10-14 DIAGNOSIS — J45.41 MODERATE PERSISTENT ASTHMA WITH ACUTE EXACERBATION: Primary | ICD-10-CM

## 2020-10-14 RX ORDER — ALBUTEROL SULFATE 90 UG/1
2 AEROSOL, METERED RESPIRATORY (INHALATION) EVERY 6 HOURS PRN
Qty: 1 INHALER | Refills: 5 | Status: SHIPPED | OUTPATIENT
Start: 2020-10-14 | End: 2021-07-29 | Stop reason: SDUPTHER

## 2021-03-10 DIAGNOSIS — Z23 ENCOUNTER FOR IMMUNIZATION: ICD-10-CM

## 2021-07-29 ENCOUNTER — TELEMEDICINE (OUTPATIENT)
Dept: FAMILY MEDICINE CLINIC | Facility: CLINIC | Age: 31
End: 2021-07-29
Payer: COMMERCIAL

## 2021-07-29 VITALS — HEIGHT: 73 IN | WEIGHT: 207 LBS | BODY MASS INDEX: 27.43 KG/M2

## 2021-07-29 DIAGNOSIS — I10 ESSENTIAL HYPERTENSION: ICD-10-CM

## 2021-07-29 DIAGNOSIS — J45.41 MODERATE PERSISTENT ASTHMA WITH ACUTE EXACERBATION: Primary | ICD-10-CM

## 2021-07-29 DIAGNOSIS — L20.82 FLEXURAL ECZEMA: ICD-10-CM

## 2021-07-29 PROCEDURE — 3008F BODY MASS INDEX DOCD: CPT | Performed by: NURSE PRACTITIONER

## 2021-07-29 PROCEDURE — 99214 OFFICE O/P EST MOD 30 MIN: CPT | Performed by: NURSE PRACTITIONER

## 2021-07-29 PROCEDURE — 1036F TOBACCO NON-USER: CPT | Performed by: NURSE PRACTITIONER

## 2021-07-29 PROCEDURE — 3725F SCREEN DEPRESSION PERFORMED: CPT | Performed by: NURSE PRACTITIONER

## 2021-07-29 RX ORDER — ALBUTEROL SULFATE 90 UG/1
2 AEROSOL, METERED RESPIRATORY (INHALATION) EVERY 6 HOURS PRN
Qty: 6.7 G | Refills: 2 | Status: SHIPPED | OUTPATIENT
Start: 2021-07-29 | End: 2021-11-29 | Stop reason: SDUPTHER

## 2021-07-29 RX ORDER — CANDESARTAN 16 MG/1
16 TABLET ORAL DAILY
Qty: 90 TABLET | Refills: 2 | Status: SHIPPED | OUTPATIENT
Start: 2021-07-29 | End: 2021-11-29 | Stop reason: SDUPTHER

## 2021-07-29 RX ORDER — MOMETASONE FUROATE 1 MG/G
CREAM TOPICAL DAILY
Qty: 45 G | Refills: 0 | Status: SHIPPED | OUTPATIENT
Start: 2021-07-29 | End: 2021-11-29 | Stop reason: SDUPTHER

## 2021-07-29 NOTE — PROGRESS NOTES
Virtual Regular Visit    Verification of patient location:    Patient is located in the following state in which I hold an active license [place]      Assessment/Plan:    Problem List Items Addressed This Visit        Respiratory    Moderate persistent asthma with acute exacerbation - Primary    Relevant Medications    fluticasone-salmeterol (Advair) 250-50 mcg/dose inhaler    albuterol (PROVENTIL HFA,VENTOLIN HFA) 90 mcg/act inhaler       Cardiovascular and Mediastinum    Essential hypertension     Reports he does not check BP at home but reports no issues with medication  Significant family history  Recent weight loss of 25 lbs  Stable, no changes today  Relevant Medications    candesartan (ATACAND) 16 mg tablet       Musculoskeletal and Integument    Flexural eczema    Relevant Medications    mometasone (ELOCON) 0 1 % cream               Reason for visit is   Chief Complaint   Patient presents with    Virtual Brief Visit     med check and eczema     Virtual Regular Visit        Encounter provider Arcadio Huang [address] [place] [address]    Provider located at [address] [place] [address]  [id number]      Recent Visits  No visits were found meeting these conditions  Showing recent visits within past 7 days and meeting all other requirements  Today's Visits  Date Type Provider Dept   07/29/21 Telemedicine Arcadio Huang, [address] [address] [facility]icians   Showing today's visits and meeting all other requirements  Future Appointments  No visits were found meeting these conditions  Showing future appointments within next 150 days and meeting all other requirements       The patient was identified by name and date of birth  Navneet Eng  was informed that this is a telemedicine visit and that the visit is being conducted through [address] [place] [address] and patient was informed that this is not a secure, HIPAA-compliant platform  He agrees to proceed     My office door was closed  No one else was in the room  He acknowledged consent and understanding of privacy and security of the video platform  The patient has agreed to participate and understands they can discontinue the visit at any time  Patient is aware this is a billable service  Subjective  Jennifer Wu  is a 28 y o  male who is scheduled for a medication check  Additionally, expresses concern for ongoing eczema rash  Reports rash is itchy and is around his neck, on the backs of both knees and on the insides of his elbows  Reports his rash "comes in waves"  States his asthma has been well controlled with daily use of advair inhaler  Past Medical History:   Diagnosis Date    Asthma     Essential hypertension 12/13/2018    Flexural eczema 12/4/2018       Past Surgical History:   Procedure Laterality Date    CLAVICLE SURGERY         Current Outpatient Medications   Medication Sig Dispense Refill    albuterol (PROVENTIL HFA,VENTOLIN HFA) 90 mcg/act inhaler Inhale 2 puffs every 6 (six) hours as needed for wheezing or shortness of breath 6 7 g 2    candesartan (ATACAND) 16 mg tablet Take 1 tablet (16 mg total) by mouth daily 90 tablet 2    FEXOFENADINE HCL PO Take by mouth as needed (Allegra)      fluticasone-salmeterol (Advair) 250-50 mcg/dose inhaler Inhale 1 puff 2 (two) times a day Rinse mouth after use  60 blister 2    mometasone (ELOCON) 0 1 % cream Apply topically daily 45 g 0     No current facility-administered medications for this visit  No Known Allergies    Review of Systems   Constitutional: Negative for chills, fatigue and fever  Respiratory: Negative for cough, chest tightness and shortness of breath  Cardiovascular: Negative for chest pain  Skin: Positive for rash  Video Exam    Vitals:    07/29/21 0933   Weight: 93 9 kg (207 lb)   Height: 6' 1" (1 854 m)         Physical Exam  Constitutional:       Appearance: Normal appearance     HENT:      Head: Normocephalic and atraumatic  Neurological:      Mental Status: He is alert and oriented to person, place, and time  Psychiatric:         Mood and Affect: Mood normal           I spent 20 minutes directly with the patient during this visit    [address] Randie Leyden  verbally agrees to participate in [employer]  Pt is aware that [employer] could be limited without vital signs or the ability to perform a full hands-on physical exam  Thomas Randie Leyden  understands he or the provider may request at any time to terminate the video visit and request the patient to seek care or treatment in person

## 2021-07-29 NOTE — ASSESSMENT & PLAN NOTE
Reports he does not check BP at home but reports no issues with medication  Significant family history  Recent weight loss of 25 lbs  Stable, no changes today

## 2021-11-29 DIAGNOSIS — J45.41 MODERATE PERSISTENT ASTHMA WITH ACUTE EXACERBATION: ICD-10-CM

## 2021-11-29 DIAGNOSIS — I10 ESSENTIAL HYPERTENSION: ICD-10-CM

## 2021-11-29 DIAGNOSIS — L20.82 FLEXURAL ECZEMA: ICD-10-CM

## 2021-11-29 RX ORDER — ALBUTEROL SULFATE 90 UG/1
2 AEROSOL, METERED RESPIRATORY (INHALATION) EVERY 6 HOURS PRN
Qty: 6.7 G | Refills: 2 | Status: SHIPPED | OUTPATIENT
Start: 2021-11-29 | End: 2022-07-12 | Stop reason: SDUPTHER

## 2021-11-29 RX ORDER — MOMETASONE FUROATE 1 MG/G
CREAM TOPICAL DAILY
Qty: 45 G | Refills: 0 | Status: SHIPPED | OUTPATIENT
Start: 2021-11-29 | End: 2022-07-12 | Stop reason: SDUPTHER

## 2021-11-29 RX ORDER — CANDESARTAN 16 MG/1
16 TABLET ORAL DAILY
Qty: 90 TABLET | Refills: 2 | Status: SHIPPED | OUTPATIENT
Start: 2021-11-29 | End: 2022-07-12

## 2022-07-12 ENCOUNTER — OFFICE VISIT (OUTPATIENT)
Dept: FAMILY MEDICINE CLINIC | Facility: CLINIC | Age: 32
End: 2022-07-12
Payer: COMMERCIAL

## 2022-07-12 VITALS
TEMPERATURE: 97.6 F | HEART RATE: 87 BPM | SYSTOLIC BLOOD PRESSURE: 130 MMHG | HEIGHT: 74 IN | RESPIRATION RATE: 12 BRPM | WEIGHT: 204 LBS | OXYGEN SATURATION: 95 % | BODY MASS INDEX: 26.18 KG/M2 | DIASTOLIC BLOOD PRESSURE: 90 MMHG

## 2022-07-12 DIAGNOSIS — L20.82 FLEXURAL ECZEMA: ICD-10-CM

## 2022-07-12 DIAGNOSIS — J45.40 MODERATE PERSISTENT ASTHMA WITHOUT COMPLICATION: ICD-10-CM

## 2022-07-12 DIAGNOSIS — Z11.4 SCREENING FOR HIV (HUMAN IMMUNODEFICIENCY VIRUS): ICD-10-CM

## 2022-07-12 DIAGNOSIS — E78.2 MIXED HYPERLIPIDEMIA: ICD-10-CM

## 2022-07-12 DIAGNOSIS — Z13.29 SCREENING FOR HYPOTHYROIDISM: ICD-10-CM

## 2022-07-12 DIAGNOSIS — Z11.59 ENCOUNTER FOR HEPATITIS C SCREENING TEST FOR LOW RISK PATIENT: ICD-10-CM

## 2022-07-12 DIAGNOSIS — J45.41 MODERATE PERSISTENT ASTHMA WITH ACUTE EXACERBATION: ICD-10-CM

## 2022-07-12 DIAGNOSIS — I10 ESSENTIAL HYPERTENSION: Primary | ICD-10-CM

## 2022-07-12 PROCEDURE — 99214 OFFICE O/P EST MOD 30 MIN: CPT | Performed by: FAMILY MEDICINE

## 2022-07-12 PROCEDURE — 36415 COLL VENOUS BLD VENIPUNCTURE: CPT | Performed by: FAMILY MEDICINE

## 2022-07-12 RX ORDER — ALBUTEROL SULFATE 90 UG/1
2 AEROSOL, METERED RESPIRATORY (INHALATION) EVERY 6 HOURS PRN
Qty: 6.7 G | Refills: 2 | Status: SHIPPED | OUTPATIENT
Start: 2022-07-12

## 2022-07-12 RX ORDER — MOMETASONE FUROATE 1 MG/G
CREAM TOPICAL DAILY
Qty: 45 G | Refills: 0 | Status: SHIPPED | OUTPATIENT
Start: 2022-07-12

## 2022-07-12 RX ORDER — FLUTICASONE PROPIONATE AND SALMETEROL 50; 250 UG/1; UG/1
1 POWDER RESPIRATORY (INHALATION) 2 TIMES DAILY
Qty: 60 BLISTER | Refills: 2 | Status: SHIPPED | OUTPATIENT
Start: 2022-07-12 | End: 2022-10-18 | Stop reason: SDUPTHER

## 2022-07-12 NOTE — ASSESSMENT & PLAN NOTE
STABLE  DENIES ANY CP, SOB, PALPITATIONS, OR HEADACHE  NOTES NO WATER RETENTION  NO CONCERNS    - CONTINUE CURRENT TREATMENT PLAN  - MONITOR DIETARY SODIUM INTAKE  - ENCOURAGE PHYSICAL ACTIVITY  - RV 3 MONTHS

## 2022-07-12 NOTE — PROGRESS NOTES
BMI Counseling: Body mass index is 26 19 kg/m²  The BMI is above normal  Nutrition recommendations include encouraging healthy choices of fruits and vegetables and moderation in carbohydrate intake  Exercise recommendations include exercising 3-5 times per week  No pharmacotherapy was ordered  Rationale for BMI follow-up plan is due to patient being overweight or obese  Assessment/Plan:    Essential hypertension  STABLE  DENIES ANY CP, SOB, PALPITATIONS, OR HEADACHE  NOTES NO WATER RETENTION  NO CONCERNS    - CONTINUE CURRENT TREATMENT PLAN  - MONITOR DIETARY SODIUM INTAKE  - ENCOURAGE PHYSICAL ACTIVITY  - RV 3 MONTHS      Mixed hyperlipidemia  WATCHING CHOLESTEROL INTAKE  COMPLIANT WITH MEDICATION  NO CONCERNS    - CONTINUE TO MONITOR DIETARY CHOL INTAKE  - CONTINUE CURRENT MEDICATION  - ENCOURAGED PHYSICAL ACTIVITY         Diagnoses and all orders for this visit:    Essential hypertension  -     Cancel: Comprehensive metabolic panel  -     Cancel: Uric acid    Moderate persistent asthma without complication  -     Cancel: CBC and differential    Mixed hyperlipidemia  -     Cancel: Lipid panel    Screening for hypothyroidism  -     Cancel: TSH, 3rd generation    Screening for HIV (human immunodeficiency virus)  -     Cancel: Human Immunodeficiency Virus 1/2 Antigen / Antibody ( Fourth Generation) with Reflex Testing    Encounter for hepatitis C screening test for low risk patient  -     Cancel: Hepatitis C antibody    Flexural eczema  -     mometasone (ELOCON) 0 1 % cream; Apply topically daily    Moderate persistent asthma with acute exacerbation  -     albuterol (PROVENTIL HFA,VENTOLIN HFA) 90 mcg/act inhaler; Inhale 2 puffs every 6 (six) hours as needed for wheezing or shortness of breath  -     Fluticasone-Salmeterol (Advair Diskus) 250-50 mcg/dose inhaler; Inhale 1 puff 2 (two) times a day Rinse mouth after use          Patient Instructions   CONTINUE CURRENT TREATMENT PLAN  MONITOR DIETARY SODIUM, CHOL, AND CARBOHYDRATE INTAKE  ENCOURAGE PHYSICAL ACTIVITY    RV 3 M, SOONER PRN      Return in about 3 months (around 10/12/2022) for Annual physical     Subjective:      Patient ID: Dominique Martinez  is a 33 y o  male  Chief Complaint   Patient presents with    Medication Management       PATIENT RETURNS FOR ROUTINE EVALUATION OF PATIENT'S MEDICAL ISSUES    INDIVIDUAL MEDICAL ISSUES WITH THEIR CURRENT STATUS, ASSESSMENT AND PLANS ARE LISTED ABOVE          The following portions of the patient's history were reviewed and updated as appropriate: allergies, current medications, past family history, past medical history, past social history, past surgical history and problem list     Review of Systems   Constitutional: Negative for chills, fatigue and fever  HENT: Negative for congestion, ear discharge, ear pain, mouth sores, postnasal drip, sore throat and trouble swallowing  Eyes: Negative for pain, discharge and visual disturbance  Respiratory: Negative for cough, shortness of breath and wheezing  Cardiovascular: Negative for chest pain, palpitations and leg swelling  Gastrointestinal: Negative for abdominal distention, abdominal pain, blood in stool, diarrhea and nausea  Endocrine: Negative for polydipsia, polyphagia and polyuria  Genitourinary: Negative for dysuria, frequency, hematuria and urgency  Musculoskeletal: Negative for arthralgias, gait problem and joint swelling  Skin: Negative for pallor and rash  Neurological: Negative for dizziness, syncope, speech difficulty, weakness, light-headedness, numbness and headaches  Hematological: Negative for adenopathy  Psychiatric/Behavioral: Negative for behavioral problems, confusion and sleep disturbance  The patient is not nervous/anxious            Current Outpatient Medications   Medication Sig Dispense Refill    albuterol (PROVENTIL HFA,VENTOLIN HFA) 90 mcg/act inhaler Inhale 2 puffs every 6 (six) hours as needed for wheezing or shortness of breath 6 7 g 2    Fluticasone-Salmeterol (Advair Diskus) 250-50 mcg/dose inhaler Inhale 1 puff 2 (two) times a day Rinse mouth after use  60 blister 2    mometasone (ELOCON) 0 1 % cream Apply topically daily 45 g 0     No current facility-administered medications for this visit  Objective:    /90   Pulse 87   Temp 97 6 °F (36 4 °C) (Temporal)   Resp 12   Ht 6' 2" (1 88 m)   Wt 92 5 kg (204 lb)   SpO2 95%   BMI 26 19 kg/m²        Physical Exam  Constitutional:       Appearance: He is well-developed  HENT:      Head: Normocephalic and atraumatic  Eyes:      General:         Right eye: No discharge  Left eye: No discharge  Conjunctiva/sclera: Conjunctivae normal       Pupils: Pupils are equal, round, and reactive to light  Neck:      Thyroid: No thyromegaly  Vascular: No JVD  Cardiovascular:      Rate and Rhythm: Normal rate and regular rhythm  Heart sounds: Normal heart sounds  No murmur heard  Pulmonary:      Effort: Pulmonary effort is normal       Breath sounds: Normal breath sounds  No wheezing or rales  Abdominal:      General: Bowel sounds are normal       Palpations: Abdomen is soft  There is no mass  Tenderness: There is no abdominal tenderness  There is no guarding or rebound  Musculoskeletal:         General: No tenderness or deformity  Normal range of motion  Cervical back: Neck supple  Lymphadenopathy:      Cervical: No cervical adenopathy  Skin:     General: Skin is warm and dry  Findings: No erythema or rash  Neurological:      Mental Status: He is alert and oriented to person, place, and time  Psychiatric:         Behavior: Behavior normal          Thought Content:  Thought content normal          Judgment: Judgment normal                 Joanne Peña MD

## 2022-08-09 DIAGNOSIS — Z13.29 SCREENING FOR THYROID DISORDER: ICD-10-CM

## 2022-08-09 DIAGNOSIS — I10 ESSENTIAL HYPERTENSION: ICD-10-CM

## 2022-08-09 DIAGNOSIS — E78.2 MIXED HYPERLIPIDEMIA: ICD-10-CM

## 2022-08-09 DIAGNOSIS — Z13.220 SCREENING, LIPID: ICD-10-CM

## 2022-08-09 DIAGNOSIS — Z11.4 SCREENING FOR HIV (HUMAN IMMUNODEFICIENCY VIRUS): ICD-10-CM

## 2022-08-09 DIAGNOSIS — Z11.59 ENCOUNTER FOR HEPATITIS C SCREENING TEST FOR LOW RISK PATIENT: ICD-10-CM

## 2022-08-09 DIAGNOSIS — Z00.00 ROUTINE GENERAL MEDICAL EXAMINATION AT HEALTH CARE FACILITY: Primary | ICD-10-CM

## 2022-08-09 DIAGNOSIS — Z13.29 SCREENING FOR HYPOTHYROIDISM: ICD-10-CM

## 2022-10-18 DIAGNOSIS — J45.41 MODERATE PERSISTENT ASTHMA WITH ACUTE EXACERBATION: ICD-10-CM

## 2022-10-18 RX ORDER — FLUTICASONE PROPIONATE AND SALMETEROL 250; 50 UG/1; UG/1
1 POWDER RESPIRATORY (INHALATION) 2 TIMES DAILY
Qty: 60 BLISTER | Refills: 2 | Status: SHIPPED | OUTPATIENT
Start: 2022-10-18

## 2022-11-15 ENCOUNTER — RA CDI HCC (OUTPATIENT)
Dept: OTHER | Facility: HOSPITAL | Age: 32
End: 2022-11-15

## 2022-11-15 NOTE — PROGRESS NOTES
[facility]  coding opportunities       Chart reviewed, no opportunity found: CHART REVIEWED, NO OPPORTUNITY FOUND        Patients Insurance        Commercial Insurance: Debra Delgado

## 2022-12-19 DIAGNOSIS — E78.2 MIXED HYPERLIPIDEMIA: ICD-10-CM

## 2022-12-19 DIAGNOSIS — Z11.59 ENCOUNTER FOR HEPATITIS C SCREENING TEST FOR LOW RISK PATIENT: ICD-10-CM

## 2022-12-19 DIAGNOSIS — Z13.220 SCREENING, LIPID: ICD-10-CM

## 2022-12-19 DIAGNOSIS — I10 ESSENTIAL HYPERTENSION: ICD-10-CM

## 2022-12-19 DIAGNOSIS — Z11.4 SCREENING FOR HIV (HUMAN IMMUNODEFICIENCY VIRUS): ICD-10-CM

## 2022-12-19 DIAGNOSIS — Z00.00 ROUTINE GENERAL MEDICAL EXAMINATION AT HEALTH CARE FACILITY: Primary | ICD-10-CM

## 2022-12-19 DIAGNOSIS — Z13.29 SCREENING FOR THYROID DISORDER: ICD-10-CM

## 2023-01-11 ENCOUNTER — RA CDI HCC (OUTPATIENT)
Dept: OTHER | Facility: HOSPITAL | Age: 33
End: 2023-01-11

## 2023-01-11 NOTE — PROGRESS NOTES
[facility]  coding opportunities          Chart Reviewed number of suggestions sent to Provider: 1  [phone]     Patients Insurance        Commercial Insurance: Debra Delgado

## 2023-01-18 LAB
ALBUMIN SERPL-MCNC: 4.5 G/DL (ref 4–5)
ALBUMIN/GLOB SERPL: 1.6 {RATIO} (ref 1.2–2.2)
ALP SERPL-CCNC: 61 IU/L (ref 44–121)
ALT SERPL-CCNC: 24 IU/L (ref 0–44)
AST SERPL-CCNC: 19 IU/L (ref 0–40)
BASOPHILS # BLD AUTO: 0.1 X10E3/UL (ref 0–0.2)
BASOPHILS NFR BLD AUTO: 1 %
BILIRUB SERPL-MCNC: 0.2 MG/DL (ref 0–1.2)
BUN SERPL-MCNC: 7 MG/DL (ref 6–20)
BUN/CREAT SERPL: 9 (ref 9–20)
CALCIUM SERPL-MCNC: 9.8 MG/DL (ref 8.7–10.2)
CHLORIDE SERPL-SCNC: 104 MMOL/L (ref 96–106)
CHOLEST SERPL-MCNC: 208 MG/DL (ref 100–199)
CHOLEST/HDLC SERPL: 5 RATIO (ref 0–5)
CO2 SERPL-SCNC: 23 MMOL/L (ref 20–29)
CREAT SERPL-MCNC: 0.77 MG/DL (ref 0.76–1.27)
EGFR: 123 ML/MIN/1.73
EOSINOPHIL # BLD AUTO: 0.4 X10E3/UL (ref 0–0.4)
EOSINOPHIL NFR BLD AUTO: 6 %
ERYTHROCYTE [DISTWIDTH] IN BLOOD BY AUTOMATED COUNT: 12.8 % (ref 11.6–15.4)
GLOBULIN SER-MCNC: 2.9 G/DL (ref 1.5–4.5)
GLUCOSE SERPL-MCNC: 105 MG/DL (ref 70–99)
HCT VFR BLD AUTO: 41 % (ref 37.5–51)
HCV AB S/CO SERPL IA: <0.1 S/CO RATIO (ref 0–0.9)
HDLC SERPL-MCNC: 42 MG/DL
HGB BLD-MCNC: 14.3 G/DL (ref 13–17.7)
HIV 1+2 AB+HIV1 P24 AG SERPL QL IA: NON REACTIVE
IMM GRANULOCYTES # BLD: 0 X10E3/UL (ref 0–0.1)
IMM GRANULOCYTES NFR BLD: 1 %
LDLC SERPL CALC-MCNC: 142 MG/DL (ref 0–99)
LYMPHOCYTES # BLD AUTO: 1.6 X10E3/UL (ref 0.7–3.1)
LYMPHOCYTES NFR BLD AUTO: 24 %
MCH RBC QN AUTO: 31.4 PG (ref 26.6–33)
MCHC RBC AUTO-ENTMCNC: 34.9 G/DL (ref 31.5–35.7)
MCV RBC AUTO: 90 FL (ref 79–97)
MONOCYTES # BLD AUTO: 0.6 X10E3/UL (ref 0.1–0.9)
MONOCYTES NFR BLD AUTO: 9 %
NEUTROPHILS # BLD AUTO: 3.9 X10E3/UL (ref 1.4–7)
NEUTROPHILS NFR BLD AUTO: 59 %
PLATELET # BLD AUTO: 518 X10E3/UL (ref 150–450)
POTASSIUM SERPL-SCNC: 4.4 MMOL/L (ref 3.5–5.2)
PROT SERPL-MCNC: 7.4 G/DL (ref 6–8.5)
RBC # BLD AUTO: 4.56 X10E6/UL (ref 4.14–5.8)
SL AMB VLDL CHOLESTEROL CALC: 24 MG/DL (ref 5–40)
SODIUM SERPL-SCNC: 142 MMOL/L (ref 134–144)
TRIGL SERPL-MCNC: 131 MG/DL (ref 0–149)
TSH SERPL DL<=0.005 MIU/L-ACNC: 1.7 UIU/ML (ref 0.45–4.5)
WBC # BLD AUTO: 6.5 X10E3/UL (ref 3.4–10.8)

## 2023-01-19 ENCOUNTER — OFFICE VISIT (OUTPATIENT)
Dept: FAMILY MEDICINE CLINIC | Facility: CLINIC | Age: 33
End: 2023-01-19

## 2023-01-19 VITALS
OXYGEN SATURATION: 97 % | WEIGHT: 219 LBS | HEIGHT: 74 IN | RESPIRATION RATE: 12 BRPM | DIASTOLIC BLOOD PRESSURE: 86 MMHG | HEART RATE: 76 BPM | BODY MASS INDEX: 28.11 KG/M2 | TEMPERATURE: 98.4 F | SYSTOLIC BLOOD PRESSURE: 124 MMHG

## 2023-01-19 DIAGNOSIS — D75.839 THROMBOCYTOSIS: ICD-10-CM

## 2023-01-19 DIAGNOSIS — J45.41 MODERATE PERSISTENT ASTHMA WITH ACUTE EXACERBATION: ICD-10-CM

## 2023-01-19 DIAGNOSIS — E78.2 MIXED HYPERLIPIDEMIA: ICD-10-CM

## 2023-01-19 DIAGNOSIS — Z00.00 ENCOUNTER FOR ANNUAL GENERAL MEDICAL EXAMINATION WITHOUT ABNORMAL FINDINGS IN ADULT: Primary | ICD-10-CM

## 2023-01-19 DIAGNOSIS — L20.82 FLEXURAL ECZEMA: ICD-10-CM

## 2023-01-19 DIAGNOSIS — I10 ESSENTIAL HYPERTENSION: ICD-10-CM

## 2023-01-19 RX ORDER — CANDESARTAN 16 MG/1
16 TABLET ORAL DAILY
Qty: 90 TABLET | Refills: 3 | Status: SHIPPED | OUTPATIENT
Start: 2023-01-19

## 2023-01-19 RX ORDER — ALBUTEROL SULFATE 90 UG/1
2 AEROSOL, METERED RESPIRATORY (INHALATION) EVERY 6 HOURS PRN
Qty: 6.7 G | Refills: 2 | Status: SHIPPED | OUTPATIENT
Start: 2023-01-19

## 2023-01-19 RX ORDER — CANDESARTAN 16 MG/1
16 TABLET ORAL DAILY
COMMUNITY
End: 2023-01-19 | Stop reason: SDUPTHER

## 2023-01-19 RX ORDER — MOMETASONE FUROATE 1 MG/G
CREAM TOPICAL DAILY
Qty: 45 G | Refills: 0 | Status: SHIPPED | OUTPATIENT
Start: 2023-01-19

## 2023-01-19 RX ORDER — FLUTICASONE PROPIONATE AND SALMETEROL 250; 50 UG/1; UG/1
1 POWDER RESPIRATORY (INHALATION) 2 TIMES DAILY
Qty: 60 BLISTER | Refills: 2 | Status: SHIPPED | OUTPATIENT
Start: 2023-01-19

## 2023-01-19 NOTE — PROGRESS NOTES
FAMILY PRACTICE HEALTH MAINTENANCE OFFICE VISIT  [facility] - [address] [facility]    NAME: Jose Pitt  AGE: 33 y o  SEX: male  : 1990     DATE: 2023    Assessment and Plan     1  Encounter for annual general medical examination without abnormal findings in adult  Comments:  Age appropriate screenings and recommendations discussed  Fasting labs reviewed  2  Mixed hyperlipidemia  Assessment & Plan:  Recommend heart healthy/mediteranean style eating  Discussed nutrition and exercise in detail  Improvement in lipids overall  Recheck 6 months  Orders:  -     Lipid panel; Future; Expected date: 2023  -     Lipid panel    3  Flexural eczema  -     mometasone (ELOCON) 0 1 % cream; Apply topically daily    4  Moderate persistent asthma with acute exacerbation  -     Fluticasone-Salmeterol (Advair Diskus) 250-50 mcg/dose inhaler; Inhale 1 puff 2 (two) times a day Rinse mouth after use  -     albuterol (PROVENTIL HFA,VENTOLIN HFA) 90 mcg/act inhaler; Inhale 2 puffs every 6 (six) hours as needed for wheezing or shortness of breath    5  Essential hypertension  -     candesartan (ATACAND) 16 mg tablet; Take 1 tablet (16 mg total) by mouth daily    6  Thrombocytosis  -     CBC and differential; Future  -     CBC and differential      · Patient Counseling:   · Nutrition: Stressed importance of a well balanced diet, moderation of sodium/saturated fat, caloric balance and sufficient intake of fiber  · Exercise: Stressed the importance of regular exercise with a goal of 150 minutes per week  · Dental Health: Discussed daily flossing and brushing and regular dental visits   · Alcohol Use:  Recommended moderation of alcohol intake  · Injury Prevention: Discussed Safety Belts, Safety Helmets, and Smoke Detectors    · Immunizations reviewed: Risks and Benefits discussed and Declined recommended vaccinations  · Discussed benefits of:  Screening labs    • BMI Counseling: Body mass index is 28 12 kg/m²  Discussed with patient's BMI with him  The BMI is above normal  Nutrition recommendations include 3-5 servings of fruits/vegetables daily  Exercise recommendations include exercising 3-5 times per week  Return for Annual physical         Chief Complaint     Chief Complaint   Patient presents with   • Annual Exam       History of Present Illness     HPI    Well Adult Physical   Patient here for a comprehensive physical exam       Diet and Physical Activity  Diet: well balanced diet  Exercise: intermittently      Depression Screen  PHQ-2/9 Depression Screening    Little interest or pleasure in doing things: 0 - not at all  Feeling down, depressed, or hopeless: 0 - not at all  PHQ-2 Score: 0  PHQ-2 Interpretation: Negative depression screen          General Health  Hearing: Normal:  bilateral  Vision: no vision problems  Dental: regular dental visits    Reproductive Health  No issues       The following portions of the patient's history were reviewed and updated as appropriate: allergies, current medications, past family history, past medical history, past social history, past surgical history and problem list     Review of Systems     Review of Systems   Constitutional: Negative for diaphoresis, fatigue and fever  HENT: Negative for ear pain and hearing loss  Eyes: Negative for pain and visual disturbance  Respiratory: Negative for chest tightness and shortness of breath  Cardiovascular: Negative for chest pain, palpitations and leg swelling  Gastrointestinal: Negative for abdominal pain, constipation and diarrhea  Genitourinary: Negative for difficulty urinating  Musculoskeletal: Negative for arthralgias and myalgias  Skin: Negative for rash  Neurological: Negative for dizziness, numbness and headaches  Psychiatric/Behavioral: Negative for sleep disturbance         Past Medical History     Past Medical History:   Diagnosis Date   • Asthma    • Essential hypertension 12/13/2018   • Flexural eczema 12/4/2018       Past Surgical History     Past Surgical History:   Procedure Laterality Date   • CLAVICLE SURGERY         Social History     Social History     Socioeconomic History   • Marital status: Unknown     Spouse name: None   • Number of children: None   • Years of education: None   • Highest education level: None   Occupational History   • None   Tobacco Use   • Smoking status: Former     Packs/day: 1 00     Years: 3 00     Pack years: 3 00     Types: Cigarettes     Quit date: 2/10/2019     Years since quitting: 3 9   • Smokeless tobacco: Former   • Tobacco comments:     quit vaping 8/2019   Vaping Use   • Vaping Use: Former   Substance and Sexual Activity   • Alcohol use: Yes     Comment: social   • Drug use: No   • Sexual activity: Yes     Partners: Female     Birth control/protection: None   Other Topics Concern   • None   Social History Narrative   • None     Social Determinants of Health     Financial Resource Strain: Not on file   Food Insecurity: Not on file   Transportation Needs: Not on file   Physical Activity: Not on file   Stress: Not on file   Social Connections: Not on file   Intimate Partner Violence: Not on file   Housing Stability: Not on file       Family History     Family History   Problem Relation Age of Onset   • No Known Problems Mother    • No Known Problems Father    • Mental illness Paternal Grandmother    • Substance Abuse Neg Hx        Current Medications       Current Outpatient Medications:   •  albuterol (PROVENTIL HFA,VENTOLIN HFA) 90 mcg/act inhaler, Inhale 2 puffs every 6 (six) hours as needed for wheezing or shortness of breath, Disp: 6 7 g, Rfl: 2  •  candesartan (ATACAND) 16 mg tablet, Take 1 tablet (16 mg total) by mouth daily, Disp: 90 tablet, Rfl: 3  •  Fluticasone-Salmeterol (Advair Diskus) 250-50 mcg/dose inhaler, Inhale 1 puff 2 (two) times a day Rinse mouth after use , Disp: 60 blister, Rfl: 2  •  mometasone (ELOCON) 0 1 % cream, Apply topically daily, Disp: 45 g, Rfl: 0     Allergies     No Known Allergies    Objective     /86 (BP Location: Left arm, Patient Position: Sitting, Cuff Size: Large)   Pulse 76   Temp 98 4 °F (36 9 °C) (Temporal)   Resp 12   Ht 6' 2" (1 88 m)   Wt 99 3 kg (219 lb)   SpO2 97%   BMI 28 12 kg/m²      Physical Exam  Vitals reviewed  Constitutional:       General: He is not in acute distress  Appearance: Normal appearance  He is well-developed  He is not diaphoretic  HENT:      Head: Normocephalic and atraumatic  Right Ear: Tympanic membrane, ear canal and external ear normal       Left Ear: Tympanic membrane, ear canal and external ear normal    Eyes:      General: Lids are normal       Extraocular Movements: Extraocular movements intact  Conjunctiva/sclera: Conjunctivae normal       Pupils: Pupils are equal, round, and reactive to light  Pupils are equal       Funduscopic exam:     Right eye: No hemorrhage or exudate  Red reflex present  Left eye: No hemorrhage or exudate  Red reflex present  Neck:      Thyroid: No thyroid mass or thyromegaly  Vascular: No carotid bruit  Cardiovascular:      Rate and Rhythm: Normal rate and regular rhythm  Pulses: Normal pulses  Heart sounds: Normal heart sounds, S1 normal and S2 normal  No murmur heard  Pulmonary:      Effort: Pulmonary effort is normal       Breath sounds: Normal breath sounds  No decreased breath sounds, wheezing, rhonchi or rales  Abdominal:      General: Bowel sounds are normal  There is no distension  Palpations: Abdomen is soft  There is no hepatomegaly or splenomegaly  Tenderness: There is no abdominal tenderness  Musculoskeletal:         General: No tenderness or deformity  Normal range of motion  Cervical back: Full passive range of motion without pain, normal range of motion and neck supple  Right lower leg: No edema  Left lower leg: No edema  Lymphadenopathy:      Cervical: No cervical adenopathy  Upper Body:      Right upper body: No supraclavicular adenopathy  Left upper body: No supraclavicular adenopathy  Skin:     General: Skin is warm and dry  Findings: No rash  Neurological:      General: No focal deficit present  Mental Status: He is alert and oriented to person, place, and time  Cranial Nerves: No cranial nerve deficit  Motor: Motor function is intact  Coordination: Coordination normal       Deep Tendon Reflexes: Reflexes are normal and symmetric  Psychiatric:         Speech: Speech normal          Behavior: Behavior normal          Thought Content:  Thought content normal          Judgment: Judgment normal                Emmett Viera, [address] [facility]

## 2023-01-19 NOTE — ASSESSMENT & PLAN NOTE
Recommend heart healthy/mediteranean style eating  Discussed nutrition and exercise in detail  Improvement in lipids overall  Recheck 6 months

## 2023-07-25 ENCOUNTER — TELEPHONE (OUTPATIENT)
Dept: FAMILY MEDICINE CLINIC | Facility: CLINIC | Age: 33
End: 2023-07-25

## 2023-07-25 DIAGNOSIS — Z86.16 HISTORY OF COVID-19: Primary | ICD-10-CM

## 2023-07-25 NOTE — TELEPHONE ENCOUNTER
Haim Angeline called and stated that he wanted to get the covid antibody test along with the other bw he is getting. Going tomorrow. Uses [facility]    He called his insurance and they will cover it. He did get covid vaccine way back in the beginning of covid however stated that he was sick back in march and he did not test but states that he has had no taste or smell since then. Made aware that it will not tell him if he had covid or not and that there is a good chance that because he had the vaccine it will tell him that he is immuned. normal for race

## 2023-07-25 NOTE — TELEPHONE ENCOUNTER
Order placed. Please remind pt, I do nothing with this result. This would be for his information only. I do not find it clinically useful at this time. Thanks!      Terri Muñoz

## 2023-07-26 ENCOUNTER — RA CDI HCC (OUTPATIENT)
Dept: OTHER | Facility: HOSPITAL | Age: 33
End: 2023-07-26

## 2023-07-26 LAB
BASOPHILS # BLD AUTO: 0 X10E3/UL (ref 0–0.2)
BASOPHILS NFR BLD AUTO: 1 %
EOSINOPHIL # BLD AUTO: 0.5 X10E3/UL (ref 0–0.4)
EOSINOPHIL NFR BLD AUTO: 10 %
ERYTHROCYTE [DISTWIDTH] IN BLOOD BY AUTOMATED COUNT: 13.5 % (ref 11.6–15.4)
HCT VFR BLD AUTO: 42.5 % (ref 37.5–51)
HGB BLD-MCNC: 14.6 G/DL (ref 13–17.7)
IMM GRANULOCYTES # BLD: 0 X10E3/UL (ref 0–0.1)
IMM GRANULOCYTES NFR BLD: 0 %
LYMPHOCYTES # BLD AUTO: 1.8 X10E3/UL (ref 0.7–3.1)
LYMPHOCYTES NFR BLD AUTO: 34 %
MCH RBC QN AUTO: 31.3 PG (ref 26.6–33)
MCHC RBC AUTO-ENTMCNC: 34.4 G/DL (ref 31.5–35.7)
MCV RBC AUTO: 91 FL (ref 79–97)
MONOCYTES # BLD AUTO: 0.4 X10E3/UL (ref 0.1–0.9)
MONOCYTES NFR BLD AUTO: 9 %
NEUTROPHILS # BLD AUTO: 2.4 X10E3/UL (ref 1.4–7)
NEUTROPHILS NFR BLD AUTO: 46 %
PLATELET # BLD AUTO: 258 X10E3/UL (ref 150–450)
RBC # BLD AUTO: 4.67 X10E6/UL (ref 4.14–5.8)
WBC # BLD AUTO: 5.2 X10E3/UL (ref 3.4–10.8)

## 2023-07-26 NOTE — PROGRESS NOTES
[facility] coding opportunities          Chart Reviewed number of suggestions sent to Provider: 1  [id number]     Patients Insurance        Commercial Insurance: [address]

## 2023-07-27 LAB
CHOLEST SERPL-MCNC: 233 MG/DL (ref 100–199)
CHOLEST/HDLC SERPL: 4.2 RATIO (ref 0–5)
HDLC SERPL-MCNC: 56 MG/DL
LDLC SERPL CALC-MCNC: 152 MG/DL (ref 0–99)
SARS-COV-2 SEMI-QUANT IGG AB: >800 AU/ML
SARS-COV-2 SPIKE AB INTERP CONTAINING ATTACHED ABBREV COVDSN: POSITIVE
SL AMB VLDL CHOLESTEROL CALC: 25 MG/DL (ref 5–40)
TRIGL SERPL-MCNC: 139 MG/DL (ref 0–149)

## 2023-08-02 ENCOUNTER — OFFICE VISIT (OUTPATIENT)
Dept: FAMILY MEDICINE CLINIC | Facility: CLINIC | Age: 33
End: 2023-08-02
Payer: COMMERCIAL

## 2023-08-02 VITALS
WEIGHT: 216 LBS | TEMPERATURE: 97 F | RESPIRATION RATE: 12 BRPM | HEIGHT: 74 IN | BODY MASS INDEX: 27.72 KG/M2 | HEART RATE: 82 BPM | DIASTOLIC BLOOD PRESSURE: 88 MMHG | OXYGEN SATURATION: 96 % | SYSTOLIC BLOOD PRESSURE: 138 MMHG

## 2023-08-02 DIAGNOSIS — E78.2 MIXED HYPERLIPIDEMIA: Primary | ICD-10-CM

## 2023-08-02 DIAGNOSIS — R10.32 LEFT INGUINAL PAIN: ICD-10-CM

## 2023-08-02 DIAGNOSIS — I10 ESSENTIAL HYPERTENSION: ICD-10-CM

## 2023-08-02 PROCEDURE — 99214 OFFICE O/P EST MOD 30 MIN: CPT | Performed by: NURSE PRACTITIONER

## 2023-08-02 RX ORDER — CETIRIZINE HYDROCHLORIDE 10 MG/1
10 TABLET ORAL DAILY
COMMUNITY

## 2023-08-02 NOTE — PROGRESS NOTES
Assessment/Plan:    1. Mixed hyperlipidemia  Assessment & Plan:  Recommend heart healthy/mediteranean style eating. Discussed nutrition and exercise in detail. Handout provided. Recheck in 6 months. 2. Essential hypertension  Assessment & Plan:  Advise nutrition and regular exercise as tolerated. BP stable in office today. 3. Left inguinal pain  -     US groin/inguinal area; Future; Expected date: 08/02/2023            Patient Instructions   Red Yeast Rice supplement - over the counter       Return if symptoms worsen or fail to improve, for Annual physical, Recheck LIPIDS. Subjective:      Patient ID: Juan J Mejia. is a 29 y.o. male. Chief Complaint   Patient presents with   • Maddie Bowser is a 29year old male with hypertension, asthma and hyperlipidemia who presents to the office for review of lab work. Reports that he is having left sided inguinal pain x's weeks. The following portions of the patient's history were reviewed and updated as appropriate: allergies, current medications, past family history, past medical history, past social history, past surgical history and problem list.    Review of Systems   Constitutional: Negative for chills, fatigue and fever. Respiratory: Negative for shortness of breath. Cardiovascular: Negative for chest pain. Gastrointestinal: Negative for abdominal pain. Musculoskeletal:        Left sided inguinal pain         Current Outpatient Medications   Medication Sig Dispense Refill   • albuterol (PROVENTIL HFA,VENTOLIN HFA) 90 mcg/act inhaler Inhale 2 puffs every 6 (six) hours as needed for wheezing or shortness of breath 6.7 g 2   • candesartan (ATACAND) 16 mg tablet Take 1 tablet (16 mg total) by mouth daily 90 tablet 3   • cetirizine (ZyrTEC) 10 mg tablet Take 10 mg by mouth daily     • Fluticasone-Salmeterol (Advair Diskus) 250-50 mcg/dose inhaler Inhale 1 puff 2 (two) times a day Rinse mouth after use.  60 blister 2   • mometasone (ELOCON) 0.1 % cream Apply topically daily 45 g 0     No current facility-administered medications for this visit. Objective:    /88 (BP Location: Left arm, Patient Position: Sitting, Cuff Size: Large)   Pulse 82   Temp (!) 97 °F (36.1 °C) (Temporal)   Resp 12   Ht 6' 2" (1.88 m)   Wt 98 kg (216 lb)   SpO2 96%   BMI 27.73 kg/m²        Physical Exam  Vitals reviewed. Constitutional:       General: He is not in acute distress. Appearance: He is well-developed. He is not diaphoretic. HENT:      Head: Normocephalic and atraumatic. Eyes:      General:         Right eye: No discharge. Left eye: No discharge. Conjunctiva/sclera: Conjunctivae normal.   Neck:      Thyroid: No thyromegaly. Cardiovascular:      Rate and Rhythm: Normal rate and regular rhythm. Heart sounds: Normal heart sounds. Pulmonary:      Effort: Pulmonary effort is normal. No respiratory distress. Breath sounds: Normal breath sounds. No decreased breath sounds, wheezing, rhonchi or rales. Abdominal:      Tenderness: There is abdominal tenderness. Musculoskeletal:      Cervical back: Normal range of motion and neck supple. Lymphadenopathy:      Cervical: No cervical adenopathy. Skin:     General: Skin is warm and dry. Findings: No rash. Neurological:      Mental Status: He is alert and oriented to person, place, and time. Psychiatric:         Behavior: Behavior normal.         Thought Content:  Thought content normal.         Judgment: Judgment normal.                CATRACHITA Gr

## 2023-08-03 DIAGNOSIS — L20.82 FLEXURAL ECZEMA: ICD-10-CM

## 2023-08-03 DIAGNOSIS — J45.41 MODERATE PERSISTENT ASTHMA WITH ACUTE EXACERBATION: ICD-10-CM

## 2023-08-03 PROBLEM — Z13.29 SCREENING FOR THYROID DISORDER: Status: RESOLVED | Noted: 2017-10-09 | Resolved: 2023-08-03

## 2023-08-03 PROBLEM — Z13.220 SCREENING, LIPID: Status: RESOLVED | Noted: 2017-10-09 | Resolved: 2023-08-03

## 2023-08-03 NOTE — ASSESSMENT & PLAN NOTE
Recommend heart healthy/mediteranean style eating. Discussed nutrition and exercise in detail. Handout provided. Recheck in 6 months.

## 2023-08-03 NOTE — TELEPHONE ENCOUNTER
Requested medication(s) are due for refill today: Yes  Patient has already received a courtesy refill: No  Other reason request has been forwarded to provider: Medication not assigned to a protocol, review manually.

## 2023-08-04 RX ORDER — MOMETASONE FUROATE 1 MG/G
CREAM TOPICAL DAILY
Qty: 45 G | Refills: 0 | Status: SHIPPED | OUTPATIENT
Start: 2023-08-04

## 2023-08-04 RX ORDER — FLUTICASONE PROPIONATE AND SALMETEROL 250; 50 UG/1; UG/1
1 POWDER RESPIRATORY (INHALATION) 2 TIMES DAILY
Qty: 60 BLISTER | Refills: 2 | Status: SHIPPED | OUTPATIENT
Start: 2023-08-04

## 2023-08-04 RX ORDER — ALBUTEROL SULFATE 90 UG/1
2 AEROSOL, METERED RESPIRATORY (INHALATION) EVERY 6 HOURS PRN
Qty: 6.7 G | Refills: 2 | Status: SHIPPED | OUTPATIENT
Start: 2023-08-04

## 2023-08-29 DIAGNOSIS — L20.82 FLEXURAL ECZEMA: ICD-10-CM

## 2023-08-29 RX ORDER — MOMETASONE FUROATE 1 MG/G
CREAM TOPICAL
Qty: 45 G | Refills: 0 | Status: SHIPPED | OUTPATIENT
Start: 2023-08-29

## 2023-08-29 NOTE — TELEPHONE ENCOUNTER
Requested medication(s) are due for refill today: No  Patient has already received a courtesy refill: Yes  Other reason request has been forwarded to provider:  Medication not assigned to a protocol, review manually.

## 2023-10-11 DIAGNOSIS — J45.41 MODERATE PERSISTENT ASTHMA WITH ACUTE EXACERBATION: ICD-10-CM

## 2023-10-11 RX ORDER — ALBUTEROL SULFATE 90 UG/1
AEROSOL, METERED RESPIRATORY (INHALATION)
Qty: 7 G | Refills: 2 | Status: SHIPPED | OUTPATIENT
Start: 2023-10-11

## 2023-10-16 DIAGNOSIS — J45.41 MODERATE PERSISTENT ASTHMA WITH ACUTE EXACERBATION: ICD-10-CM

## 2023-10-16 RX ORDER — FLUTICASONE PROPIONATE AND SALMETEROL 50; 250 UG/1; UG/1
POWDER RESPIRATORY (INHALATION) 2 TIMES DAILY
Qty: 60 BLISTER | Refills: 0 | Status: SHIPPED | OUTPATIENT
Start: 2023-10-16

## 2023-11-22 ENCOUNTER — TELEPHONE (OUTPATIENT)
Dept: FAMILY MEDICINE CLINIC | Facility: CLINIC | Age: 33
End: 2023-11-22

## 2023-11-22 ENCOUNTER — OFFICE VISIT (OUTPATIENT)
Dept: FAMILY MEDICINE CLINIC | Facility: CLINIC | Age: 33
End: 2023-11-22
Payer: COMMERCIAL

## 2023-11-22 ENCOUNTER — NURSE TRIAGE (OUTPATIENT)
Age: 33
End: 2023-11-22

## 2023-11-22 VITALS
WEIGHT: 225 LBS | TEMPERATURE: 97.6 F | BODY MASS INDEX: 28.88 KG/M2 | SYSTOLIC BLOOD PRESSURE: 148 MMHG | HEIGHT: 74 IN | OXYGEN SATURATION: 94 % | HEART RATE: 85 BPM | RESPIRATION RATE: 14 BRPM | DIASTOLIC BLOOD PRESSURE: 100 MMHG

## 2023-11-22 DIAGNOSIS — I10 ESSENTIAL HYPERTENSION: ICD-10-CM

## 2023-11-22 DIAGNOSIS — J45.41 MODERATE PERSISTENT ASTHMA WITH EXACERBATION: Primary | ICD-10-CM

## 2023-11-22 PROCEDURE — 99213 OFFICE O/P EST LOW 20 MIN: CPT | Performed by: STUDENT IN AN ORGANIZED HEALTH CARE EDUCATION/TRAINING PROGRAM

## 2023-11-22 RX ORDER — PREDNISONE 20 MG/1
40 TABLET ORAL DAILY
Qty: 20 TABLET | Refills: 0 | Status: SHIPPED | OUTPATIENT
Start: 2023-11-22 | End: 2023-12-02

## 2023-11-22 NOTE — TELEPHONE ENCOUNTER
Hollie Davidson was in to see you today. He forgot to mention to you that he also has a nebulizer.     He will need a refill of the albuterol solution    There is nothing in his chart    States 2.5 mg/3 mg.  Uses as needed    [facility] in [place]

## 2023-11-22 NOTE — TELEPHONE ENCOUNTER
Reason for Disposition  • MODERATE longstanding difficulty breathing (e.g., speaks in phrases, SOB even at rest, pulse 100-120) and SAME as normal    Answer Assessment - Initial Assessment Questions  1. RESPIRATORY STATUS: "Describe your breathing?" (e.g., wheezing, shortness of breath, unable to speak, severe coughing)       SOB and wheezing  2. ONSET: "When did this breathing problem begin?"       Two weks  3. PATTERN "Does the difficult breathing come and go, or has it been constant since it started?"       Intermittent   4. SEVERITY: "How bad is your breathing?" (e.g., mild, moderate, severe)     - MILD: No SOB at rest, mild SOB with walking, speaks normally in sentences, can lay down, no retractions, pulse < 100.     - MODERATE: SOB at rest, SOB with minimal exertion and prefers to sit, cannot lie down flat, speaks in phrases, mild retractions, audible wheezing, pulse 100-120.     - SEVERE: Very SOB at rest, speaks in single words, struggling to breathe, sitting hunched forward, retractions, pulse > 120       moderate  5. RECURRENT SYMPTOM: "Have you had difficulty breathing before?" If Yes, ask: "When was the last time?" and "What happened that time?"       History Asthma  6. CARDIAC HISTORY: "Do you have any history of heart disease?" (e.g., heart attack, angina, bypass surgery, angioplasty)       HTN  7. LUNG HISTORY: "Do you have any history of lung disease?"  (e.g., pulmonary embolus, asthma, emphysema)      asthma  8. CAUSE: "What do you think is causing the breathing problem?"       unknown  9. OTHER SYMPTOMS: "Do you have any other symptoms? (e.g., dizziness, runny nose, cough, chest pain, fever)      Productive cough    11.  TRAVEL: "Have you traveled out of the country in the last month?" (e.g., travel history, exposures)        [place] in October , covid in October    Protocols used: Breathing Difficulty-ADULT-[place]

## 2023-11-22 NOTE — TELEPHONE ENCOUNTER
Joann Lunsford came in for an appt and paid his $45 copay. I noticed that he had a balance of $25 from 8/2 visit. He stated that he paid $45 at the visit (I have the receipt and he paid $45)    I spoke to billing and mentioned the he may be charged for seeing Noa Arango as a specialist.  She stated that was what happened and will resubmit the [address] Spoke to GG. Spoke to Joann Lunsford to let him know.

## 2023-11-22 NOTE — PROGRESS NOTES
Name: Michelle Briceno. : 1990      MRN: [id number]  Encounter Provider: Nishant Brewer MD  Encounter Date: 2023   Encounter department: [facility]     1. Moderate persistent asthma with exacerbation  -     predniSONE 20 mg tablet; Take 2 tablets (40 mg total) by mouth daily for 10 days    2. Essential hypertension  Assessment & Plan:  -BP in office 148/100  -Notes he is wheezing and not feeling well as this could be contributing to high BP  -Denies headache, blurry vision, chest pain  -Continue current medical regimen             Subjective      HPI    Patient reports worsening of asthma symptoms for three weeks. He has been using his inhalers. Notes symptoms are worse during the day and not as bad at night. He works for [employer] and notes nature of job could be exacerbating symptoms. He is wheezing. Review of Systems   Constitutional:  Positive for activity change. Negative for appetite change, chills, fatigue and fever. HENT:  Negative for congestion. Respiratory:  Positive for shortness of breath and wheezing. Negative for cough. Cardiovascular:  Negative for chest pain, palpitations and leg swelling. Gastrointestinal:  Negative for abdominal pain, constipation, diarrhea, nausea and vomiting. Musculoskeletal:  Negative for myalgias. Skin:  Negative for rash. Neurological:  Negative for light-headedness and headaches. Psychiatric/Behavioral:  The patient is not nervous/anxious.         Current Outpatient Medications on File Prior to Visit   Medication Sig   • Advair Diskus 250-50 MCG/ACT inhaler INHALE 1 DOSE BY MOUTH TWICE DAILY RINSE MOUTH AFTER USE   • albuterol (PROVENTIL HFA,VENTOLIN HFA) 90 mcg/act inhaler INHALE 2 PUFFS BY MOUTH EVERY 6 HOURS AS NEEDED FOR WHEEZING FOR SHORTNESS OF BREATH   • candesartan (ATACAND) 16 mg tablet Take 1 tablet (16 mg total) by mouth daily   • mometasone (ELOCON) 0.1 % cream APPLY CREAM TOPICALLY ONCE DAILY (Patient taking differently: daily as needed)   • cetirizine (ZyrTEC) 10 mg tablet Take 10 mg by mouth daily (Patient not taking: Reported on 11/22/2023)       Objective     /100 (BP Location: Left arm, Patient Position: Sitting, Cuff Size: Large)   Pulse 85   Temp 97.6 °F (36.4 °C) (Temporal)   Resp 14   Ht 6' 2" (1.88 m)   Wt 102 kg (225 lb)   SpO2 94%   BMI 28.89 kg/m²     Physical Exam  Constitutional:       Appearance: Normal appearance. HENT:      Head: Normocephalic and atraumatic. Cardiovascular:      Rate and Rhythm: Normal rate and regular rhythm. Pulses: Normal pulses. Heart sounds: Normal heart sounds. Pulmonary:      Effort: Pulmonary effort is normal.      Breath sounds: Examination of the right-upper field reveals wheezing. Examination of the left-upper field reveals wheezing. Examination of the right-middle field reveals wheezing. Examination of the left-middle field reveals wheezing. Examination of the right-lower field reveals wheezing. Examination of the left-lower field reveals wheezing. Wheezing present. Neurological:      General: No focal deficit present. Mental Status: He is alert and oriented to person, place, and time. Psychiatric:         Mood and Affect: Mood normal.         Behavior: Behavior normal.         Thought Content:  Thought content normal.         Judgment: Judgment normal.       Tami Mai MD

## 2023-11-22 NOTE — TELEPHONE ENCOUNTER
Regarding: triage  ----- Message from Jeannie Brock sent at 11/22/2023  9:22 AM EST -----  Pt's wife called to report that the pt has had a cough and sob that is worsening. Pt is asthmatic and symptoms started weeks ago. Pt is using a nebulizer but it is more frequent now. Please, triage .

## 2023-11-22 NOTE — ASSESSMENT & PLAN NOTE
-BP in office 148/100  -Notes he is wheezing and not feeling well as this could be contributing to high BP  -Denies headache, blurry vision, chest pain  -Continue current medical regimen

## 2023-11-27 DIAGNOSIS — J45.41 MODERATE PERSISTENT ASTHMA WITH EXACERBATION: Primary | ICD-10-CM

## 2023-11-27 RX ORDER — ALBUTEROL SULFATE 2.5 MG/3ML
2.5 SOLUTION RESPIRATORY (INHALATION) EVERY 6 HOURS PRN
Qty: 3 ML | Refills: 25 | Status: SHIPPED | OUTPATIENT
Start: 2023-11-27 | End: 2023-11-28 | Stop reason: SDUPTHER

## 2023-11-28 DIAGNOSIS — J45.41 MODERATE PERSISTENT ASTHMA WITH EXACERBATION: ICD-10-CM

## 2023-11-28 RX ORDER — ALBUTEROL SULFATE 2.5 MG/3ML
2.5 SOLUTION RESPIRATORY (INHALATION) EVERY 6 HOURS PRN
Qty: 90 ML | Refills: 3 | Status: SHIPPED | OUTPATIENT
Start: 2023-11-28

## 2023-11-28 NOTE — TELEPHONE ENCOUNTER
Spoke to pharmacy. They stated that it should be 75 or 90 ml's with 3 refills. They cannot take a verbal, they need a new rx sent in.       [facility] in [place]

## 2023-12-28 DIAGNOSIS — I10 ESSENTIAL HYPERTENSION: ICD-10-CM

## 2023-12-28 DIAGNOSIS — J45.41 MODERATE PERSISTENT ASTHMA WITH ACUTE EXACERBATION: ICD-10-CM

## 2023-12-28 DIAGNOSIS — E78.2 MIXED HYPERLIPIDEMIA: Primary | ICD-10-CM

## 2023-12-28 DIAGNOSIS — Z00.00 ENCOUNTER FOR ANNUAL GENERAL MEDICAL EXAMINATION WITHOUT ABNORMAL FINDINGS IN ADULT: ICD-10-CM

## 2023-12-28 DIAGNOSIS — Z13.29 SCREENING FOR THYROID DISORDER: ICD-10-CM

## 2023-12-28 RX ORDER — FLUTICASONE PROPIONATE AND SALMETEROL 50; 250 UG/1; UG/1
1 POWDER RESPIRATORY (INHALATION) 2 TIMES DAILY
Qty: 60 BLISTER | Refills: 5 | Status: SHIPPED | OUTPATIENT
Start: 2023-12-28

## 2023-12-28 NOTE — TELEPHONE ENCOUNTER
Reason for call:   [x] Refill   [] Prior Auth  [] Other:     Office:   [x] PCP/Provider - Kathy   [] Specialty/Provider -     Medication: Advair Diskus 250-50 MCG/ACT inhaler     Dose/Frequency:  INHALE 1 DOSE BY MOUTH TWICE DAILY RINSE MOUTH AFTER USE     Quantity: 60    Pharmacy: [facility] in [place]    Does the patient have enough for 3 days?   [] Yes   [x] No - Send as HP to POD

## 2024-01-15 ENCOUNTER — RA CDI HCC (OUTPATIENT)
Dept: OTHER | Facility: HOSPITAL | Age: 34
End: 2024-01-15

## 2024-01-15 DIAGNOSIS — J45.41 MODERATE PERSISTENT ASTHMA WITH ACUTE EXACERBATION: ICD-10-CM

## 2024-01-15 RX ORDER — ALBUTEROL SULFATE 90 UG/1
2 AEROSOL, METERED RESPIRATORY (INHALATION) EVERY 6 HOURS PRN
Qty: 7 G | Refills: 1 | Status: SHIPPED | OUTPATIENT
Start: 2024-01-15

## 2024-01-15 NOTE — TELEPHONE ENCOUNTER
Reason for call:   [x] Refill   [] Prior Auth  [] Other:     Office:   [x] PCP/Provider -   [] Specialty/Provider -     Medication: Albuterol 90 mcg/act inhaler, inhale 2 puffs by mouth every 6 hours as needed      Pharmacy: [facility] [address], [facility]    Does the patient have enough for 3 days?   [x] Yes   [] No - Send as HP to POD

## 2024-01-15 NOTE — PROGRESS NOTES
HCC coding opportunities          Chart Reviewed number of suggestions sent to Provider: 1     Patients Insurance        Commercial Insurance: [employer]     J45.40

## 2024-01-23 ENCOUNTER — TELEPHONE (OUTPATIENT)
Age: 34
End: 2024-01-23

## 2024-01-23 NOTE — TELEPHONE ENCOUNTER
PA for Advair Diskus 250-50 MCG/ACT inhaler    Submitted via  [][employer]-KEY   [x][employer]-Case ID # [id number]  []Faxed to plan   []Other website   []Phone call Case ID #     Office notes sent, clinical questions answered. Awaiting determination

## 2024-01-24 ENCOUNTER — TELEPHONE (OUTPATIENT)
Age: 34
End: 2024-01-24

## 2024-01-24 DIAGNOSIS — J45.41 MODERATE PERSISTENT ASTHMA WITH ACUTE EXACERBATION: Primary | ICD-10-CM

## 2024-01-24 NOTE — TELEPHONE ENCOUNTER
William Chavez    Patients script for:  Advair Diskus 250-50 MCG/ACT inhaler     Was denied, patient is asking for the generic to be called into his pharmacy or something similar please.    Pharmacy:  [facility]  [address]    Patient CB: [phone]

## 2024-01-24 NOTE — TELEPHONE ENCOUNTER
PA for Advair Diskus 250-50 MCG/ACT inhaler Denied    Reason:       (Generic Advair is available on formulary )     Message sent to office clinical pool Yes    Denial letter scanned into Media Yes    Appeal started No

## 2024-01-25 RX ORDER — FLUTICASONE PROPIONATE AND SALMETEROL 250; 50 UG/1; UG/1
1 POWDER RESPIRATORY (INHALATION) 2 TIMES DAILY
Qty: 180 BLISTER | Refills: 3 | Status: SHIPPED | OUTPATIENT
Start: 2024-01-25 | End: 2025-01-19

## 2024-02-09 DIAGNOSIS — I10 ESSENTIAL HYPERTENSION: ICD-10-CM

## 2024-02-09 RX ORDER — CANDESARTAN 16 MG/1
16 TABLET ORAL DAILY
Qty: 30 TABLET | Refills: 0 | Status: SHIPPED | OUTPATIENT
Start: 2024-02-09

## 2024-02-28 DIAGNOSIS — J45.41 MODERATE PERSISTENT ASTHMA WITH ACUTE EXACERBATION: ICD-10-CM

## 2024-02-28 RX ORDER — ALBUTEROL SULFATE 90 UG/1
2 AEROSOL, METERED RESPIRATORY (INHALATION) EVERY 6 HOURS PRN
Qty: 6.7 G | Refills: 5 | Status: SHIPPED | OUTPATIENT
Start: 2024-02-28

## 2024-03-04 DIAGNOSIS — I10 ESSENTIAL HYPERTENSION: ICD-10-CM

## 2024-03-04 RX ORDER — CANDESARTAN 16 MG/1
16 TABLET ORAL DAILY
Qty: 30 TABLET | Refills: 0 | Status: SHIPPED | OUTPATIENT
Start: 2024-03-04

## 2024-03-04 NOTE — TELEPHONE ENCOUNTER
Patient needs updated blood work and has previously placed orders. Please contact patient to go for labs. Courtesy refill given.

## 2024-04-05 DIAGNOSIS — I10 ESSENTIAL HYPERTENSION: ICD-10-CM

## 2024-04-09 RX ORDER — CANDESARTAN 16 MG/1
16 TABLET ORAL DAILY
Qty: 30 TABLET | Refills: 0 | Status: SHIPPED | OUTPATIENT
Start: 2024-04-09

## 2024-04-09 NOTE — TELEPHONE ENCOUNTER
Received a fax for Candesartan to be filled. I l left message on cell.  Rivas needs bw done (orders in chart made to [facility]) before a refill can be done.  Advised that he can go to [employer] and they can pull our orders from their system.    Message sent to AG on 4/5  Courtesy refill previously given. Patient needs updated blood work and has previously placed orders. Please contact patient to go for labs. Pt's last comprehensive metabolic panel lab work was on 01/17/2023.

## 2024-04-12 NOTE — TELEPHONE ENCOUNTER
Spoke to Riavs.  He stated that he did receive his refill.  I look and they gave him a courtesy refill.  Advised to get the bw done to avoid any future delays with medication.  He will get that done

## 2024-07-26 DIAGNOSIS — L20.82 FLEXURAL ECZEMA: ICD-10-CM

## 2024-07-26 RX ORDER — MOMETASONE FUROATE 1 MG/G
CREAM TOPICAL DAILY PRN
Qty: 45 G | Refills: 0 | Status: SHIPPED | OUTPATIENT
Start: 2024-07-26

## 2024-07-26 NOTE — TELEPHONE ENCOUNTER
Reason for call:   [x] Refill   [] Prior Auth  [] Other:     Office:   [x] PCP/Provider -   [] Specialty/Provider -             Does the patient have enough for 3 days?   [] Yes   [x] No - Send as HP to POD

## 2024-08-17 DIAGNOSIS — L20.82 FLEXURAL ECZEMA: ICD-10-CM

## 2024-08-19 ENCOUNTER — TELEPHONE (OUTPATIENT)
Age: 34
End: 2024-08-19

## 2024-08-19 DIAGNOSIS — J45.41 MODERATE PERSISTENT ASTHMA WITH ACUTE EXACERBATION: ICD-10-CM

## 2024-08-19 RX ORDER — MOMETASONE FUROATE 1 MG/G
CREAM TOPICAL
Qty: 45 G | Refills: 0 | Status: SHIPPED | OUTPATIENT
Start: 2024-08-19

## 2024-08-19 NOTE — TELEPHONE ENCOUNTER
Left message to return call.  When pt calls back, please ask/relay Katya's question and send response back to us.  Thank you.

## 2024-08-19 NOTE — TELEPHONE ENCOUNTER
Received call from Bhavna at [facility] pharmacy requesting where patient will be applying   mometasone (ELOCON) 0.1 % cream  so they can calculate how much patient will require to fill the order .Please follow up with pharmacy for provider response.

## 2024-08-19 NOTE — TELEPHONE ENCOUNTER
"Can you call patient and confirm areas of use? I think he gets this in multiple places which is why I wrote \"affected area\" and said dispense 45 grams. "

## 2024-08-20 RX ORDER — ALBUTEROL SULFATE 90 UG/1
2 AEROSOL, METERED RESPIRATORY (INHALATION) EVERY 6 HOURS PRN
Qty: 28 G | Refills: 0 | Status: SHIPPED | OUTPATIENT
Start: 2024-08-20

## 2024-08-20 NOTE — TELEPHONE ENCOUNTER
FANII: Bhavna at [facility] wanted to know where the mometasone (ELOCON) 0.1 % cream should be applied on pt's body. Bhavna was transferred to Geetha who was extremely helpful and assisted.

## 2024-11-25 ENCOUNTER — OFFICE VISIT (OUTPATIENT)
Dept: URGENT CARE | Facility: CLINIC | Age: 34
End: 2024-11-25
Payer: COMMERCIAL

## 2024-11-25 VITALS
WEIGHT: 226 LBS | OXYGEN SATURATION: 96 % | RESPIRATION RATE: 20 BRPM | SYSTOLIC BLOOD PRESSURE: 138 MMHG | DIASTOLIC BLOOD PRESSURE: 92 MMHG | TEMPERATURE: 98.2 F | HEART RATE: 83 BPM | BODY MASS INDEX: 29 KG/M2 | HEIGHT: 74 IN

## 2024-11-25 DIAGNOSIS — J02.0 ACUTE STREPTOCOCCAL PHARYNGITIS: Primary | ICD-10-CM

## 2024-11-25 LAB — S PYO AG THROAT QL: POSITIVE

## 2024-11-25 PROCEDURE — 87880 STREP A ASSAY W/OPTIC: CPT | Performed by: FAMILY MEDICINE

## 2024-11-25 PROCEDURE — 99213 OFFICE O/P EST LOW 20 MIN: CPT | Performed by: FAMILY MEDICINE

## 2024-11-25 RX ORDER — PENICILLIN V POTASSIUM 500 MG/1
500 TABLET, FILM COATED ORAL EVERY 12 HOURS
Qty: 20 TABLET | Refills: 0 | Status: SHIPPED | OUTPATIENT
Start: 2024-11-25 | End: 2024-12-05

## 2024-11-25 NOTE — PROGRESS NOTES
[facility]        NAME: William Gonzales Jr. is a 34 y.o. male  : 1990    MRN: [id number]  DATE: 2024  TIME: 2:15 PM    Assessment and Plan   Acute streptococcal pharyngitis [J02.0]  1. Acute streptococcal pharyngitis  POCT rapid strepA    penicillin V potassium (VEETID) 500 mg tablet        Patient Instructions     Patient Instructions   Acute Streptococcal Pharyngitis  - rapid strep test performed in office today is positive   - Penicillin VK x 10 days prescribed, to be completed as directed  - take Tylenol or Motrin as needed for pain/fever  - patient is to rest and drink plenty of fluids   - advised warm salt water gargles and throat lozenges as needed   - drink warm tea w/ lemon and honey   - may use Chloraseptic throat spray as needed   - follow up w/ PCP office for re-check in 3-5 days  - if symptoms persist despite treatment, worsen, or any new symptoms present, patient is to be seen in the ER      Follow up with PCP in 3-5 days.  Proceed to  ER if symptoms worsen.    If tests have been performed at [facility], our office will contact you with results if changes need to be made to the care plan discussed with you at the visit.  [facility]    Chief Complaint     Chief Complaint   Patient presents with    Cold Like Symptoms     Pt ill x 5 days with sore throat, chills, and body aches. No fever. Pt used cough drops and Ziacam. BP redone   still high, pt states he stopped taking his BP meds 6 months ago.     History of Present Illness     33 yo male presents c/o sore throat, hoarse voice, and nasal congestion x 5 days. No other URI symptoms present at this time. No fever, but he has felt chills and body aches. No abdominal pain or GI sx. No skin rashes. No loss of taste or smell. Patient feels the glands in his neck are swollen and tender. No feelings of throat closing or difficulty swallowing. No drooling or muffled voice. No recent travel  or known sick contacts. Patient has no known allergies. He has taken throat lozenges and Zicam for the symptoms. Rapid strep test performed in office today is positive.       Review of Systems   Review of Systems   Constitutional:  Positive for chills. Negative for fever.   HENT:  Positive for congestion, sore throat and voice change.    Eyes: Negative.    Respiratory:  Negative for cough, chest tightness, shortness of breath and wheezing.    Cardiovascular: Negative.    Gastrointestinal: Negative.    Musculoskeletal:  Positive for myalgias.   Skin: Negative.    Allergic/Immunologic: Negative.    Neurological: Negative.    Hematological: Negative.      Current Medications       Current Outpatient Medications:     albuterol (2.5 mg/3 mL) 0.083 % nebulizer solution, Take 3 mL (2.5 mg total) by nebulization every 6 (six) hours as needed for wheezing or shortness of breath, Disp: 90 mL, Rfl: 3    albuterol (PROVENTIL HFA,VENTOLIN HFA) 90 mcg/act inhaler, INHALE 2 PUFFS BY MOUTH EVERY 6 HOURS AS NEEDED FOR WHEEZING, Disp: 28 g, Rfl: 0    candesartan (ATACAND) 16 mg tablet, Take 1 tablet by mouth once daily, Disp: 30 tablet, Rfl: 0    Fluticasone-Salmeterol (Advair) 250-50 mcg/dose inhaler, Inhale 1 puff 2 (two) times a day Rinse mouth after use., Disp: 180 blister, Rfl: 3    mometasone (ELOCON) 0.1 % cream, APPLY  CREAM TOPICALLY TO AFFECTED AREA DAILY AS NEEDED FOR RASH, Disp: 45 g, Rfl: 0    penicillin V potassium (VEETID) 500 mg tablet, Take 1 tablet (500 mg total) by mouth every 12 (twelve) hours for 10 days, Disp: 20 tablet, Rfl: 0    Current Allergies     Allergies as of 11/25/2024    (No Known Allergies)            The following portions of the patient's history were reviewed and updated as appropriate: allergies, current medications, past family history, past medical history, past social history, past surgical history and problem list.     Past Medical History:   Diagnosis Date    Asthma     Essential  "hypertension 12/13/2018    Flexural eczema 12/4/2018       Past Surgical History:   Procedure Laterality Date    CLAVICLE SURGERY         Family History   Problem Relation Age of Onset    No Known Problems Mother     No Known Problems Father     Mental illness Paternal Grandmother     Substance Abuse Neg Hx          Medications have been verified.        Objective   /92 (Patient Position: Sitting, Cuff Size: Standard)   Pulse 83   Temp 98.2 °F (36.8 °C) (Tympanic)   Resp 20   Ht 6' 2\" (1.88 m)   Wt 103 kg (226 lb)   SpO2 96%   BMI 29.02 kg/m²   No LMP for male patient.       Physical Exam     Physical Exam  Vitals and nursing note reviewed.   Constitutional:       General: He is awake. He is not in acute distress.     Appearance: Normal appearance. He is well-developed and well-groomed. He is not ill-appearing, toxic-appearing or diaphoretic.   HENT:      Head: Normocephalic and atraumatic.      Jaw: There is normal jaw occlusion.      Right Ear: Tympanic membrane, ear canal and external ear normal.      Left Ear: Tympanic membrane, ear canal and external ear normal.      Nose: Nose normal.      Mouth/Throat:      Lips: Pink. No lesions.      Mouth: Mucous membranes are moist.      Pharynx: Uvula midline. Pharyngeal swelling and posterior oropharyngeal erythema present. No oropharyngeal exudate, uvula swelling or postnasal drip.      Tonsils: No tonsillar exudate or tonsillar abscesses.      Comments: There is erythema and mild edema of the tonsils and pharynx. No exudates presents. Airway fully patent.   Eyes:      General: Lids are normal.      Conjunctiva/sclera: Conjunctivae normal.   Neck:      Trachea: Trachea and phonation normal.   Cardiovascular:      Rate and Rhythm: Normal rate and regular rhythm.      Pulses: Normal pulses.      Heart sounds: Normal heart sounds.   Pulmonary:      Effort: Pulmonary effort is normal. No tachypnea, accessory muscle usage or respiratory distress.      Breath " sounds: Normal breath sounds and air entry.   Musculoskeletal:      Cervical back: Neck supple. No edema, erythema, rigidity or tenderness.   Lymphadenopathy:      Cervical: No cervical adenopathy.   Skin:     General: Skin is warm and dry.      Capillary Refill: Capillary refill takes less than 2 seconds.      Coloration: Skin is not pale.   Neurological:      Mental Status: He is alert and oriented to person, place, and time. Mental status is at baseline.   Psychiatric:         Mood and Affect: Mood normal.         Behavior: Behavior normal. Behavior is cooperative.         Thought Content: Thought content normal.         Judgment: Judgment normal.

## 2024-11-25 NOTE — PATIENT INSTRUCTIONS
Acute Streptococcal Pharyngitis  - rapid strep test performed in office today is positive   - Penicillin VK x 10 days prescribed, to be completed as directed  - take Tylenol or Motrin as needed for pain/fever  - patient is to rest and drink plenty of fluids   - advised warm salt water gargles and throat lozenges as needed   - drink warm tea w/ lemon and honey   - may use Chloraseptic throat spray as needed   - follow up w/ PCP office for re-check in 3-5 days  - if symptoms persist despite treatment, worsen, or any new symptoms present, patient is to be seen in the ER

## 2024-11-26 ENCOUNTER — DOCUMENTATION (OUTPATIENT)
Dept: ADMINISTRATIVE | Facility: OTHER | Age: 34
End: 2024-11-26

## 2024-11-26 NOTE — PROGRESS NOTES
Blood pressure elevated  Appointment department: [facility]  Appointment provider: Matty Dahl MD  Blood pressure   11/25/24 1356 138/92   11/25/24 1330 126/90     11/26/24 11:28 AM    Patient's second BP normal, no call needed.    Thank you.  Joshua Lees MA  [facility]

## 2025-01-04 DIAGNOSIS — J45.41 MODERATE PERSISTENT ASTHMA WITH ACUTE EXACERBATION: ICD-10-CM

## 2025-01-07 RX ORDER — ALBUTEROL SULFATE 90 UG/1
2 INHALANT RESPIRATORY (INHALATION) EVERY 6 HOURS PRN
Qty: 28 G | Refills: 0 | Status: SHIPPED | OUTPATIENT
Start: 2025-01-07

## 2025-02-08 ENCOUNTER — TELEMEDICINE (OUTPATIENT)
Dept: OTHER | Facility: HOSPITAL | Age: 35
End: 2025-02-08
Payer: COMMERCIAL

## 2025-02-08 VITALS — TEMPERATURE: 100.3 F

## 2025-02-08 DIAGNOSIS — J10.1 INFLUENZA A: Primary | ICD-10-CM

## 2025-02-08 PROBLEM — F17.200 TOBACCO USE DISORDER: Status: RESOLVED | Noted: 2017-10-09 | Resolved: 2025-02-08

## 2025-02-08 PROCEDURE — 99213 OFFICE O/P EST LOW 20 MIN: CPT | Performed by: PHYSICIAN ASSISTANT

## 2025-02-08 RX ORDER — OSELTAMIVIR PHOSPHATE 75 MG/1
75 CAPSULE ORAL EVERY 12 HOURS SCHEDULED
Qty: 10 CAPSULE | Refills: 0 | Status: SHIPPED | OUTPATIENT
Start: 2025-02-08 | End: 2025-02-13

## 2025-02-08 NOTE — PROGRESS NOTES
Virtual Regular Visit  Name: William Gonzales Jr.      : 1990      MRN: [id number]  Encounter Provider: Shannon D Severino, PA-C  Encounter Date: 2025   Encounter department: VIRTUAL CARE       Verification of patient location:  Patient is located at Home in the following state in which I hold an active license [place] :  Assessment & Plan  Influenza A    Orders:    oseltamivir (TAMIFLU) 75 mg capsule; Take 1 capsule (75 mg total) by mouth every 12 (twelve) hours for 5 days        Encounter provider Shannon D Severino, PA-C    The patient was identified by name and date of birth. William Gonzales Jr. was informed that this is a telemedicine visit and that the visit is being conducted through the Epic Embedded platform. He agrees to proceed..  My office door was closed. No one else was in the room.  He acknowledged consent and understanding of privacy and security of the video platform. The patient has agreed to participate and understands they can discontinue the visit at any time.    Patient is aware this is a billable service.     History obtained from: patient, patient's Significant Other, and kids around  History of Present Illness     Reports Thursday (2 days ago) experienced some fatigue. Today had shaking chills, body aches, fevers, cough, HA. Pt reports Flu A positive. Took ibuprofen 400 mg this morning with some relief. Going on vacation on Monday.      Review of Systems   Constitutional:  Positive for chills, diaphoresis and fatigue. Negative for fever.   HENT:  Negative for nosebleeds.    Eyes:  Negative for redness.   Respiratory:  Positive for cough. Negative for shortness of breath.    Cardiovascular:  Negative for chest pain.   Gastrointestinal:  Negative for blood in stool.   Genitourinary:  Negative for hematuria.   Musculoskeletal:  Positive for myalgias. Negative for gait problem.   Skin:  Negative for rash.   Neurological:  Positive for tremors and headaches. Negative for seizures.    Psychiatric/Behavioral:  Negative for behavioral problems.        Objective   There were no vitals taken for this visit.    Physical Exam  Constitutional:       General: He is not in acute distress.     Appearance: Normal appearance. He is ill-appearing. He is not toxic-appearing.      Comments: Laying on the couch under blankets   HENT:      Head: Normocephalic and atraumatic.      Nose: No rhinorrhea.      Comments: sounds congested     Mouth/Throat:      Mouth: Mucous membranes are moist.   Eyes:      Conjunctiva/sclera: Conjunctivae normal.   Pulmonary:      Effort: Pulmonary effort is normal. No respiratory distress.      Breath sounds: No wheezing (no gross audible wheeze through computer).   Musculoskeletal:      Cervical back: Normal range of motion.   Skin:     Findings: No rash (on face or neck).   Neurological:      Mental Status: He is alert.      Cranial Nerves: No dysarthria or facial asymmetry.   Psychiatric:         Mood and Affect: Mood normal.         Behavior: Behavior normal.         Visit Time  Total Visit Duration: 7 minutes not including the time spent for establishing the audio/video connection.

## 2025-02-08 NOTE — PATIENT INSTRUCTIONS
"[facility] Phone number is [phone] if you need assistance or have further questions  note in \"Letters\" section of [employer]. Can print if opened from a web browser    You likely have a virus such as the flu or a cold. Please schedule a follow-up with your PCP within the next 2 days for recheck. Go to the ER for new worsening or concerning symptoms including but not limited to shortness of breath or chest pain    You can have fever for 3-5 days with a viral illness and then any additional symptoms that develop (congestion, cough, nausea, diarrhea) can last for another 7 days.      Stay out of work/school until fever free for 24 hours without use of tylenol or motrin     Make sure you have a thermometer and if you feel chills or sweats check it and write it down.  Take Tylenol (acetaminophen) and Motrin (ibuprofen) for fevers, body aches, and headaches. Alternate Motrin and Tylenol every 3 hours for more consistent relief.     Drink plenty of fluids to stay well hydrated- at least 2 L per day for adults  Water, hot water with lemon or honey, warm tea.   Can drink Pedialyte, Gatorade/Powerade zero, but should mix with water.      For diarrhea, vomiting and abdominal pain   Milk or juice can make diarrhea worse.  follow \"BRAT\" diet Bananas, Rice, Applesauce, Toast (also plain pasta or crackers)  Small frequent meals   Allow diarrhea to run its course. Most cases will resolved in 3-5 days     Use over-the-counter saline nasal spray/allergy medication for congestion, runny nose, and postnasal drip  Mucinex (guaifenesin) helps to thin and loosen mucous.   Claritin, Zyrtec, Xyzal, or Allegra are non-drowsy antihistamines- helps dry out mucous (will make mucous darker)  Delsym or robitussin (dextromethorphan) is a cough suppressant.  Oral decongestants- pseudoephedrine behind the counter pill helps with nasal and sinus congestion  Nasal Decongestants- phenylephrine or fluticasone nasal spray (oxymetazoline up to 3 " days)  Vicks to the front/back of the chest bottom of the feet with socks     For sore throat relief  Warm salt water gargles, warm tea with honey as needed  Cool mist humidifier at bedside- helps keep airway moist  Chloraseptic spray or throat lozenges with benzocaine (cepacol max strength).

## 2025-04-14 DIAGNOSIS — J45.41 MODERATE PERSISTENT ASTHMA WITH ACUTE EXACERBATION: ICD-10-CM

## 2025-04-17 RX ORDER — ALBUTEROL SULFATE 90 UG/1
2 INHALANT RESPIRATORY (INHALATION) EVERY 6 HOURS PRN
Qty: 28 G | Refills: 0 | OUTPATIENT
Start: 2025-04-17